# Patient Record
Sex: FEMALE | Race: BLACK OR AFRICAN AMERICAN | NOT HISPANIC OR LATINO | Employment: FULL TIME | URBAN - METROPOLITAN AREA
[De-identification: names, ages, dates, MRNs, and addresses within clinical notes are randomized per-mention and may not be internally consistent; named-entity substitution may affect disease eponyms.]

---

## 2017-03-17 ENCOUNTER — PATIENT OUTREACH (OUTPATIENT)
Dept: ADMINISTRATIVE | Facility: HOSPITAL | Age: 57
End: 2017-03-17

## 2017-03-17 NOTE — LETTER
March 17, 2017    Makenzie Pizarro  [address] [facility] [address]  [facility] [address]           [facility]  [address]  [facility] [address]  Phone: [phone] Dear Mrs. Pizarro:    [facility] is committed to your overall health.  To help you get the most out of each of your visits, we will review your information to make sure you are up to date on all of your recommended tests and/or procedures.      Denis Plunkett MD has found that you may be due for   Health Maintenance Due   Topic    TETANUS VACCINE     Mammogram     Colonoscopy     Influenza Vaccine       If you have had any of the above done at another facility, please bring the records or information with you so that your record at [facility] will be complete.    If you are currently taking medication, please bring it with you to your appointment for review.    We will be happy to assist you with scheduling any necessary appointments or you may contact the [facility] appointment desk at [phone] to schedule at your convenience.     Thank you for choosing [facility] for your healthcare needs,      If you have any questions or concerns, please don't hesitate to call.    Sincerely,    Aurelia WRAY LPN  Care Coordination Department  [facility]

## 2017-03-31 ENCOUNTER — LAB VISIT (OUTPATIENT)
Dept: LAB | Facility: HOSPITAL | Age: 57
End: 2017-03-31
Attending: FAMILY MEDICINE
Payer: COMMERCIAL

## 2017-03-31 ENCOUNTER — OFFICE VISIT (OUTPATIENT)
Dept: FAMILY MEDICINE | Facility: CLINIC | Age: 57
End: 2017-03-31
Payer: COMMERCIAL

## 2017-03-31 VITALS
SYSTOLIC BLOOD PRESSURE: 169 MMHG | HEART RATE: 107 BPM | BODY MASS INDEX: 37.54 KG/M2 | HEIGHT: 69 IN | DIASTOLIC BLOOD PRESSURE: 103 MMHG | TEMPERATURE: 98 F | WEIGHT: 253.44 LBS

## 2017-03-31 DIAGNOSIS — Z12.31 ENCOUNTER FOR SCREENING MAMMOGRAM FOR MALIGNANT NEOPLASM OF BREAST: ICD-10-CM

## 2017-03-31 DIAGNOSIS — G47.00 INSOMNIA, UNSPECIFIED TYPE: ICD-10-CM

## 2017-03-31 DIAGNOSIS — E66.01 SEVERE OBESITY (BMI 35.0-39.9) WITH COMORBIDITY: ICD-10-CM

## 2017-03-31 DIAGNOSIS — F17.200 SMOKING: ICD-10-CM

## 2017-03-31 DIAGNOSIS — M54.50 CHRONIC BILATERAL LOW BACK PAIN WITHOUT SCIATICA: ICD-10-CM

## 2017-03-31 DIAGNOSIS — I10 ESSENTIAL HYPERTENSION: Primary | ICD-10-CM

## 2017-03-31 DIAGNOSIS — I10 ESSENTIAL HYPERTENSION: ICD-10-CM

## 2017-03-31 DIAGNOSIS — G89.29 CHRONIC BILATERAL LOW BACK PAIN WITHOUT SCIATICA: ICD-10-CM

## 2017-03-31 LAB
ALBUMIN SERPL BCP-MCNC: 3.4 G/DL
ALP SERPL-CCNC: 89 U/L
ALT SERPL W/O P-5'-P-CCNC: 19 U/L
ANION GAP SERPL CALC-SCNC: 12 MMOL/L
AST SERPL-CCNC: 17 U/L
BILIRUB SERPL-MCNC: 0.4 MG/DL
BUN SERPL-MCNC: 17 MG/DL
CALCIUM SERPL-MCNC: 9.2 MG/DL
CHLORIDE SERPL-SCNC: 107 MMOL/L
CHOLEST/HDLC SERPL: 3 {RATIO}
CO2 SERPL-SCNC: 24 MMOL/L
CREAT SERPL-MCNC: 0.8 MG/DL
EST. GFR  (AFRICAN AMERICAN): >60 ML/MIN/1.73 M^2
EST. GFR  (NON AFRICAN AMERICAN): >60 ML/MIN/1.73 M^2
GLUCOSE SERPL-MCNC: 118 MG/DL
HDL/CHOLESTEROL RATIO: 33.2 %
HDLC SERPL-MCNC: 205 MG/DL
HDLC SERPL-MCNC: 68 MG/DL
LDLC SERPL CALC-MCNC: 103.2 MG/DL
NONHDLC SERPL-MCNC: 137 MG/DL
POTASSIUM SERPL-SCNC: 3.9 MMOL/L
PROT SERPL-MCNC: 7.3 G/DL
SODIUM SERPL-SCNC: 143 MMOL/L
TRIGL SERPL-MCNC: 169 MG/DL

## 2017-03-31 PROCEDURE — 36415 COLL VENOUS BLD VENIPUNCTURE: CPT | Mod: PO

## 2017-03-31 PROCEDURE — 1160F RVW MEDS BY RX/DR IN RCRD: CPT | Mod: S$GLB,,, | Performed by: FAMILY MEDICINE

## 2017-03-31 PROCEDURE — 99214 OFFICE O/P EST MOD 30 MIN: CPT | Mod: 25,S$GLB,, | Performed by: FAMILY MEDICINE

## 2017-03-31 PROCEDURE — 80061 LIPID PANEL: CPT

## 2017-03-31 PROCEDURE — 90732 PPSV23 VACC 2 YRS+ SUBQ/IM: CPT | Mod: S$GLB,,, | Performed by: FAMILY MEDICINE

## 2017-03-31 PROCEDURE — 3077F SYST BP >= 140 MM HG: CPT | Mod: S$GLB,,, | Performed by: FAMILY MEDICINE

## 2017-03-31 PROCEDURE — 3080F DIAST BP >= 90 MM HG: CPT | Mod: S$GLB,,, | Performed by: FAMILY MEDICINE

## 2017-03-31 PROCEDURE — 90471 IMMUNIZATION ADMIN: CPT | Mod: S$GLB,,, | Performed by: FAMILY MEDICINE

## 2017-03-31 PROCEDURE — 80053 COMPREHEN METABOLIC PANEL: CPT

## 2017-03-31 PROCEDURE — 99999 PR PBB SHADOW E&M-EST. PATIENT-LVL III: CPT | Mod: PBBFAC,,, | Performed by: FAMILY MEDICINE

## 2017-03-31 RX ORDER — HYDROCHLOROTHIAZIDE 25 MG/1
25 TABLET ORAL DAILY
Qty: 30 TABLET | Refills: 5 | Status: SHIPPED | OUTPATIENT
Start: 2017-03-31 | End: 2017-12-15 | Stop reason: SDUPTHER

## 2017-03-31 RX ORDER — AMLODIPINE AND BENAZEPRIL HYDROCHLORIDE 5; 20 MG/1; MG/1
1 CAPSULE ORAL DAILY
COMMUNITY
End: 2017-03-31 | Stop reason: SDUPTHER

## 2017-03-31 RX ORDER — AMLODIPINE AND BENAZEPRIL HYDROCHLORIDE 5; 20 MG/1; MG/1
1 CAPSULE ORAL DAILY
Qty: 30 CAPSULE | Refills: 5 | Status: SHIPPED | OUTPATIENT
Start: 2017-03-31 | End: 2017-06-12

## 2017-03-31 RX ORDER — MELOXICAM 7.5 MG/1
7.5 TABLET ORAL DAILY
Qty: 30 TABLET | Refills: 2 | Status: SHIPPED | OUTPATIENT
Start: 2017-03-31 | End: 2018-03-31

## 2017-03-31 RX ORDER — TRAZODONE HYDROCHLORIDE 50 MG/1
50 TABLET ORAL NIGHTLY PRN
Qty: 30 TABLET | Refills: 5 | Status: SHIPPED | OUTPATIENT
Start: 2017-03-31 | End: 2018-08-28 | Stop reason: SINTOL

## 2017-03-31 RX ORDER — OLMESARTAN MEDOXOMIL 40 MG/1
40 TABLET ORAL DAILY
COMMUNITY
End: 2017-03-31

## 2017-04-01 NOTE — PROGRESS NOTES
Follow-up hypertension.  Patient states ran out of hydrochlorothiazide about a week ago.  States compliance of the medication.  Morbidly obese with difficulty losing.  Still smokes, not itched in quitting currently.  She does complain of some chronic lower back pain without radiation.  No current treatment.  She is due for mammogram.  She also wanted something for sleep.  She works nights chronically.  She does snore but is not aware of apnea.  Not interested sleep evaluation at present.    Past Medical History:  Past Medical History:   Diagnosis Date    Endometriosis     Hypertension     Obesity, Class II, BMI 35-39.9, with comorbidity     Varicose veins      Past Surgical History:   Procedure Laterality Date    BILATERAL SALPINGOOPHORECTOMY      BREAST LUMPECTOMY      benign    HYSTERECTOMY       Social History     Social History    Marital status: Single     Spouse name: N/A    Number of children: N/A    Years of education: N/A     Occupational History    Not on file.     Social History Main Topics    Smoking status: Current Every Day Smoker     Packs/day: 0.50     Years: 35.00    Smokeless tobacco: Not on file    Alcohol use 7.0 oz/week     14 drink(s) per week    Drug use: No    Sexual activity: Not on file     Other Topics Concern    Not on file     Social History Narrative     Family History   Problem Relation Age of Onset    Hypertension Mother     Diabetes Mother     Hypertension Father     Diabetes Father     Lung cancer Paternal Grandfather     Leukemia Paternal Uncle     Lung cancer Maternal Uncle      Review of patient's allergies indicates:  No Known Allergies  Current Outpatient Prescriptions on File Prior to Visit   Medication Sig Dispense Refill    [DISCONTINUED] cyclobenzaprine (FLEXERIL) 10 MG tablet TAKE 1 TABLET (10 MG TOTAL) BY MOUTH 3 (THREE) TIMES DAILY AS NEEDED FOR MUSCLE SPASMS. 30 tablet 0    [DISCONTINUED] hydrochlorothiazide (HYDRODIURIL) 25 MG tablet Take 1  "tablet (25 mg total) by mouth once daily. 30 tablet 11    [] naproxen (NAPROSYN) 500 MG tablet Take 1 tablet (500 mg total) by mouth 2 (two) times daily as needed. 60 tablet 3    [DISCONTINUED] amlodipine-benazepril 5-20 mg (LOTREL) 5-20 mg per capsule Take 1 capsule by mouth once daily. 30 capsule 11     No current facility-administered medications on file prior to visit.            ROS:  GENERAL: No fever, chills,  or significant weight changes.   CARDIOVASCULAR: Denies chest pain, PND, orthopnea or reduced exercise tolerance.  ABDOMEN: Appetite fine. Denies diarrhea, abdominal pain, hematemesis or blood in stool.  URINARY: No flank pain, dysuria or hematuria.      OBJECTIVE:     Vitals:    17 1546   BP: (!) 169/103   Pulse: 107   Temp: 98.2 °F (36.8 °C)   Weight: 114.9 kg (253 lb 6.7 oz)   Height: 5' 9" (1.753 m)     Wt Readings from Last 3 Encounters:   17 114.9 kg (253 lb 6.7 oz)   16 116.9 kg (257 lb 11.5 oz)   03/09/15 115.2 kg (254 lb)     APPEARANCE: Well nourished, well developed, in no acute distress.    HEAD: Normocephalic.  Atraumatic.  No sinus tenderness.  EYES:   Right eye: Pupil reactive.  Conjunctiva clear.    Left eye: Pupil reactive.  Conjunctiva clear.    Both fundi:  Grossly normal to nondilated exam. EOMI.    EARS: TM's intact. Light reflex normal. No retraction or perforation.    NOSE:  clear.  MOUTH & THROAT:  No pharyngeal erythema or exudate. No lesions.  NECK: Supple. No bruits.  No JVD.  No cervical lymphadenopathy.  No thyromegaly.    CHEST: Breath sounds clear bilaterally.  Normal respiratory effort  CARDIOVASCULAR: Normal rate.  Regular rhythm.  No murmurs.  No rub.  No gallops.  ABDOMEN: Bowel sounds normal.  Soft.  No tenderness.  No organomegaly.  PERIPHERAL VASCULAR: No cyanosis.  No clubbing.  No edema.  NEUROLOGIC: No focal findings.  Negative straight leg raise.  Deep tendon reflexes intact  MENTAL STATUS: Alert.  Oriented x 3.  Back decreased range " of motion.  Mild his palpation bilateral lower paraspinous muscles.      Makenzie was seen today for medication refill and back pain.    Diagnoses and all orders for this visit:    Essential hypertension  -     Comprehensive metabolic panel; Future  -     Lipid panel; Future    Severe obesity (BMI 35.0-39.9) with comorbidity    Smoking    Chronic bilateral low back pain without sciatica    Encounter for screening mammogram for malignant neoplasm of breast  -     Mammo Digital Screening Bilat with CAD; Future    Insomnia, unspecified type    Other orders  -     amlodipine-benazepril 5-20 mg (LOTREL) 5-20 mg per capsule; Take 1 capsule by mouth once daily.  -     hydrochlorothiazide (HYDRODIURIL) 25 MG tablet; Take 1 tablet (25 mg total) by mouth once daily.  -     meloxicam (MOBIC) 7.5 MG tablet; Take 1 tablet (7.5 mg total) by mouth once daily.  -     Pneumococcal Polysaccharide Vaccine (23 Valent) (SQ/IM)  -     trazodone (DESYREL) 50 MG tablet; Take 1 tablet (50 mg total) by mouth nightly as needed for Insomnia.     resume medication.  Recheck blood pressure with nurse in 2 weeks.  She is due for colonoscopy, but would like to get blood pressure better controlled.  Declines smoking cessation.  Encourage weight loss.  Recommend physical therapy, but states difficulty with transportation.

## 2017-04-04 ENCOUNTER — HOSPITAL ENCOUNTER (OUTPATIENT)
Dept: RADIOLOGY | Facility: HOSPITAL | Age: 57
Discharge: HOME | End: 2017-04-04
Attending: FAMILY MEDICINE
Payer: COMMERCIAL

## 2017-04-04 DIAGNOSIS — Z12.31 ENCOUNTER FOR SCREENING MAMMOGRAM FOR MALIGNANT NEOPLASM OF BREAST: ICD-10-CM

## 2017-04-04 PROCEDURE — 77067 SCR MAMMO BI INCL CAD: CPT | Mod: TC

## 2017-04-04 PROCEDURE — 77063 BREAST TOMOSYNTHESIS BI: CPT | Mod: 26,,, | Performed by: RADIOLOGY

## 2017-04-04 PROCEDURE — 77067 SCR MAMMO BI INCL CAD: CPT | Mod: 26,,, | Performed by: RADIOLOGY

## 2017-04-17 RX ORDER — AMLODIPINE AND BENAZEPRIL HYDROCHLORIDE 5; 20 MG/1; MG/1
CAPSULE ORAL
Qty: 30 CAPSULE | Refills: 0 | Status: SHIPPED | OUTPATIENT
Start: 2017-04-17 | End: 2017-06-11 | Stop reason: SDUPTHER

## 2017-04-17 RX ORDER — HYDROCHLOROTHIAZIDE 25 MG/1
TABLET ORAL
Qty: 30 TABLET | Refills: 0 | Status: SHIPPED | OUTPATIENT
Start: 2017-04-17 | End: 2018-08-28 | Stop reason: SDUPTHER

## 2017-04-20 DIAGNOSIS — E78.00 ELEVATED CHOLESTEROL: Primary | ICD-10-CM

## 2017-04-20 RX ORDER — PRAVASTATIN SODIUM 20 MG/1
20 TABLET ORAL DAILY
COMMUNITY
End: 2017-04-20 | Stop reason: SDUPTHER

## 2017-04-20 RX ORDER — PRAVASTATIN SODIUM 20 MG/1
20 TABLET ORAL DAILY
Qty: 30 TABLET | Refills: 5 | Status: SHIPPED | OUTPATIENT
Start: 2017-04-20 | End: 2017-12-15 | Stop reason: SDUPTHER

## 2017-04-20 NOTE — TELEPHONE ENCOUNTER
MD GURU Sarkar Staff                     Cholesterol is elevated, sugar borderline, not diabetic level..  Recommend start pravastatin 20 mg daily.  Repeat lipid

## 2017-05-03 ENCOUNTER — TELEPHONE (OUTPATIENT)
Dept: FAMILY MEDICINE | Facility: CLINIC | Age: 57
End: 2017-05-03

## 2017-05-03 NOTE — TELEPHONE ENCOUNTER
----- Message from Magalys Solis sent at 5/3/2017  7:54 AM CDT -----  Please call patient in regards to lab results, [phone] (home)

## 2017-06-12 RX ORDER — AMLODIPINE AND BENAZEPRIL HYDROCHLORIDE 5; 20 MG/1; MG/1
CAPSULE ORAL
Qty: 30 CAPSULE | Refills: 0 | Status: SHIPPED | OUTPATIENT
Start: 2017-06-12 | End: 2017-08-18 | Stop reason: SDUPTHER

## 2017-08-18 RX ORDER — AMLODIPINE AND BENAZEPRIL HYDROCHLORIDE 5; 20 MG/1; MG/1
CAPSULE ORAL
Qty: 30 CAPSULE | Refills: 11 | Status: SHIPPED | OUTPATIENT
Start: 2017-08-18 | End: 2018-08-28 | Stop reason: SDUPTHER

## 2017-12-15 RX ORDER — HYDROCHLOROTHIAZIDE 25 MG/1
25 TABLET ORAL DAILY
Qty: 30 TABLET | Refills: 0 | Status: SHIPPED | OUTPATIENT
Start: 2017-12-15 | End: 2018-08-28 | Stop reason: SDUPTHER

## 2017-12-15 RX ORDER — PRAVASTATIN SODIUM 20 MG/1
20 TABLET ORAL DAILY
Qty: 30 TABLET | Refills: 0 | Status: SHIPPED | OUTPATIENT
Start: 2017-12-15 | End: 2018-08-28 | Stop reason: SDUPTHER

## 2018-08-28 ENCOUNTER — OFFICE VISIT (OUTPATIENT)
Dept: FAMILY MEDICINE | Facility: CLINIC | Age: 58
End: 2018-08-28
Payer: COMMERCIAL

## 2018-08-28 VITALS
WEIGHT: 270 LBS | HEIGHT: 68 IN | SYSTOLIC BLOOD PRESSURE: 163 MMHG | BODY MASS INDEX: 40.92 KG/M2 | HEART RATE: 86 BPM | DIASTOLIC BLOOD PRESSURE: 95 MMHG | TEMPERATURE: 98 F

## 2018-08-28 DIAGNOSIS — Z76.0 MEDICATION REFILL: ICD-10-CM

## 2018-08-28 DIAGNOSIS — E78.5 HYPERLIPIDEMIA, UNSPECIFIED HYPERLIPIDEMIA TYPE: ICD-10-CM

## 2018-08-28 DIAGNOSIS — Z12.31 ENCOUNTER FOR SCREENING MAMMOGRAM FOR MALIGNANT NEOPLASM OF BREAST: ICD-10-CM

## 2018-08-28 DIAGNOSIS — Z00.00 ANNUAL PHYSICAL EXAM: Primary | ICD-10-CM

## 2018-08-28 DIAGNOSIS — E66.01 SEVERE OBESITY (BMI 35.0-39.9) WITH COMORBIDITY: ICD-10-CM

## 2018-08-28 DIAGNOSIS — F17.200 SMOKING: ICD-10-CM

## 2018-08-28 DIAGNOSIS — I10 ESSENTIAL HYPERTENSION: ICD-10-CM

## 2018-08-28 PROCEDURE — 99999 PR PBB SHADOW E&M-EST. PATIENT-LVL III: CPT | Mod: PBBFAC,,, | Performed by: NURSE PRACTITIONER

## 2018-08-28 PROCEDURE — 3080F DIAST BP >= 90 MM HG: CPT | Mod: CPTII,S$GLB,, | Performed by: NURSE PRACTITIONER

## 2018-08-28 PROCEDURE — 3077F SYST BP >= 140 MM HG: CPT | Mod: CPTII,S$GLB,, | Performed by: NURSE PRACTITIONER

## 2018-08-28 PROCEDURE — 99396 PREV VISIT EST AGE 40-64: CPT | Mod: S$GLB,,, | Performed by: NURSE PRACTITIONER

## 2018-08-28 RX ORDER — AMLODIPINE AND BENAZEPRIL HYDROCHLORIDE 5; 20 MG/1; MG/1
1 CAPSULE ORAL DAILY
Qty: 90 CAPSULE | Refills: 3 | Status: SHIPPED | OUTPATIENT
Start: 2018-08-28 | End: 2019-09-30 | Stop reason: SDUPTHER

## 2018-08-28 RX ORDER — HYDROCHLOROTHIAZIDE 25 MG/1
25 TABLET ORAL DAILY
Qty: 90 TABLET | Refills: 3 | Status: SHIPPED | OUTPATIENT
Start: 2018-08-28 | End: 2020-03-30

## 2018-08-28 RX ORDER — NAPROXEN 500 MG/1
500 TABLET ORAL 2 TIMES DAILY
COMMUNITY
End: 2020-07-17

## 2018-08-28 RX ORDER — PRAVASTATIN SODIUM 20 MG/1
20 TABLET ORAL DAILY
Qty: 90 TABLET | Refills: 3 | Status: SHIPPED | OUTPATIENT
Start: 2018-08-28 | End: 2020-07-17

## 2018-08-28 NOTE — PROGRESS NOTES
Subjective:       Patient ID: Makenzie Pizarro is a 57 y.o. female.    Chief Complaint: Annual Exam  Pt in today for annual exam. HTN uncontrolled,however pt has not been taking HCTZ. Pt is also a smoker; declines cessation at present. Healthy diet, exercise discussed. Pt has been out of pravastatin for > 1 m. Labs, colonoscopy, mammogram due. Pt has no other complaints today.   Past Medical History:   Diagnosis Date    Endometriosis     Hypertension     Obesity, Class II, BMI 35-39.9, with comorbidity     Varicose veins      Social History     Socioeconomic History    Marital status: Single     Spouse name: Not on file    Number of children: Not on file    Years of education: Not on file    Highest education level: Not on file   Social Needs    Financial resource strain: Not on file    Food insecurity - worry: Not on file    Food insecurity - inability: Not on file    Transportation needs - medical: Not on file    Transportation needs - non-medical: Not on file   Occupational History    Not on file   Tobacco Use    Smoking status: Current Every Day Smoker     Packs/day: 0.50     Years: 35.00     Pack years: 17.50   Substance and Sexual Activity    Alcohol use: Yes     Alcohol/week: 7.0 oz     Types: 14 drink(s) per week    Drug use: No    Sexual activity: Not on file   Other Topics Concern    Not on file   Social History Narrative    Not on file     Past Surgical History:   Procedure Laterality Date    BILATERAL SALPINGOOPHORECTOMY      BREAST LUMPECTOMY      benign    HYSTERECTOMY         HPI  Review of Systems   Constitutional: Negative.    HENT: Negative.    Eyes: Negative.    Respiratory: Negative.    Cardiovascular: Negative.    Gastrointestinal: Negative.    Endocrine: Negative.    Genitourinary: Negative.    Musculoskeletal: Negative.    Skin: Negative.    Allergic/Immunologic: Negative.    Neurological: Negative.    Psychiatric/Behavioral: Negative.        Objective:      Physical  Exam   Constitutional: She is oriented to person, place, and time. She appears well-developed and well-nourished.   HENT:   Head: Normocephalic.   Right Ear: External ear normal.   Left Ear: External ear normal.   Nose: Nose normal.   Mouth/Throat: Oropharynx is clear and moist.   Eyes: Conjunctivae are normal. Pupils are equal, round, and reactive to light.   Neck: Normal range of motion. Neck supple.   Cardiovascular: Normal rate, regular rhythm and normal heart sounds.   Pulmonary/Chest: Effort normal and breath sounds normal.   Abdominal: Soft. Bowel sounds are normal.   Musculoskeletal: Normal range of motion.   Neurological: She is alert and oriented to person, place, and time.   Skin: Skin is warm and dry. Capillary refill takes 2 to 3 seconds.   Psychiatric: She has a normal mood and affect. Her behavior is normal. Judgment and thought content normal.   Nursing note and vitals reviewed.      Assessment:       1. Annual physical exam    2. Essential hypertension    3. Severe obesity (BMI 35.0-39.9) with comorbidity    4. Smoking    5. Encounter for screening mammogram for malignant neoplasm of breast    6. Hyperlipidemia, unspecified hyperlipidemia type   7. Medication refill        Plan:           Makenzie was seen today for annual exam.    Diagnoses and all orders for this visit:    Annual physical exam  Essential hypertension  Severe obesity (BMI 35.0-39.9) with comorbidity  Smoking  Encounter for screening mammogram for malignant neoplasm of breast  Hyperlipidemia, unspecified hyperlipidemia type  -     Mammo Digital Screening Bilateral With CAD; Future  -     Lipid panel; Future  -     Comprehensive metabolic panel; Future  -     TSH; Future  -     CBC auto differential; Future  -     Case request GI: COLONOSCOPY        Smoking cessation recommended        Healthy diet, weight loss recommended        Medications as prescribed        Monitor BP daily x 2 w; keep log; return log to clinic for review      Medication refill  -     hydroCHLOROthiazide (HYDRODIURIL) 25 MG tablet; Take 1 tablet (25 mg total) by mouth once daily.  -     pravastatin (PRAVACHOL) 20 MG tablet; Take 1 tablet (20 mg total) by mouth once daily.  -     amlodipine-benazepril 5-20 mg (LOTREL) 5-20 mg per capsule; Take 1 capsule by mouth once daily.

## 2018-08-31 ENCOUNTER — DOCUMENTATION ONLY (OUTPATIENT)
Dept: ENDOSCOPY | Facility: HOSPITAL | Age: 58
End: 2018-08-31

## 2018-11-05 RX ORDER — HYDROCHLOROTHIAZIDE 25 MG/1
25 TABLET ORAL DAILY
Qty: 30 TABLET | Refills: 0 | Status: SHIPPED | OUTPATIENT
Start: 2018-11-05 | End: 2019-03-15 | Stop reason: SDUPTHER

## 2018-11-05 NOTE — TELEPHONE ENCOUNTER
Refill ×1.  Need blood pressure check.  She also needs to get the lab work that was ordered by Toshia at last visit

## 2019-02-14 ENCOUNTER — PATIENT OUTREACH (OUTPATIENT)
Dept: ADMINISTRATIVE | Facility: HOSPITAL | Age: 59
End: 2019-02-14

## 2019-03-15 ENCOUNTER — OFFICE VISIT (OUTPATIENT)
Dept: FAMILY MEDICINE | Facility: CLINIC | Age: 59
End: 2019-03-15
Payer: COMMERCIAL

## 2019-03-15 ENCOUNTER — HOSPITAL ENCOUNTER (OUTPATIENT)
Dept: RADIOLOGY | Facility: HOSPITAL | Age: 59
Discharge: HOME | End: 2019-03-15
Attending: NURSE PRACTITIONER
Payer: COMMERCIAL

## 2019-03-15 ENCOUNTER — TELEPHONE (OUTPATIENT)
Dept: ORTHOPEDICS | Facility: CLINIC | Age: 59
End: 2019-03-15

## 2019-03-15 VITALS
SYSTOLIC BLOOD PRESSURE: 151 MMHG | DIASTOLIC BLOOD PRESSURE: 92 MMHG | HEART RATE: 83 BPM | HEIGHT: 69 IN | WEIGHT: 273 LBS | BODY MASS INDEX: 40.43 KG/M2 | TEMPERATURE: 99 F

## 2019-03-15 DIAGNOSIS — G89.29 CHRONIC MIDLINE LOW BACK PAIN WITH LEFT-SIDED SCIATICA: ICD-10-CM

## 2019-03-15 DIAGNOSIS — G89.29 CHRONIC MIDLINE LOW BACK PAIN WITH LEFT-SIDED SCIATICA: Primary | ICD-10-CM

## 2019-03-15 DIAGNOSIS — M54.42 CHRONIC MIDLINE LOW BACK PAIN WITH LEFT-SIDED SCIATICA: ICD-10-CM

## 2019-03-15 DIAGNOSIS — M54.42 CHRONIC MIDLINE LOW BACK PAIN WITH LEFT-SIDED SCIATICA: Primary | ICD-10-CM

## 2019-03-15 DIAGNOSIS — I10 ESSENTIAL HYPERTENSION: ICD-10-CM

## 2019-03-15 PROCEDURE — 3077F PR MOST RECENT SYSTOLIC BLOOD PRESSURE >= 140 MM HG: ICD-10-PCS | Mod: CPTII,S$GLB,, | Performed by: NURSE PRACTITIONER

## 2019-03-15 PROCEDURE — 3077F SYST BP >= 140 MM HG: CPT | Mod: CPTII,S$GLB,, | Performed by: NURSE PRACTITIONER

## 2019-03-15 PROCEDURE — 99999 PR PBB SHADOW E&M-EST. PATIENT-LVL V: CPT | Mod: PBBFAC,,, | Performed by: NURSE PRACTITIONER

## 2019-03-15 PROCEDURE — 99213 OFFICE O/P EST LOW 20 MIN: CPT | Mod: 25,S$GLB,, | Performed by: NURSE PRACTITIONER

## 2019-03-15 PROCEDURE — 72110 X-RAY EXAM L-2 SPINE 4/>VWS: CPT | Mod: TC,PO

## 2019-03-15 PROCEDURE — 3080F PR MOST RECENT DIASTOLIC BLOOD PRESSURE >= 90 MM HG: ICD-10-PCS | Mod: CPTII,S$GLB,, | Performed by: NURSE PRACTITIONER

## 2019-03-15 PROCEDURE — 99999 PR PBB SHADOW E&M-EST. PATIENT-LVL V: ICD-10-PCS | Mod: PBBFAC,,, | Performed by: NURSE PRACTITIONER

## 2019-03-15 PROCEDURE — 3080F DIAST BP >= 90 MM HG: CPT | Mod: CPTII,S$GLB,, | Performed by: NURSE PRACTITIONER

## 2019-03-15 PROCEDURE — 72110 X-RAY EXAM L-2 SPINE 4/>VWS: CPT | Mod: 26,,, | Performed by: RADIOLOGY

## 2019-03-15 PROCEDURE — 3008F BODY MASS INDEX DOCD: CPT | Mod: CPTII,S$GLB,, | Performed by: NURSE PRACTITIONER

## 2019-03-15 PROCEDURE — 3008F PR BODY MASS INDEX (BMI) DOCUMENTED: ICD-10-PCS | Mod: CPTII,S$GLB,, | Performed by: NURSE PRACTITIONER

## 2019-03-15 PROCEDURE — 96372 THER/PROPH/DIAG INJ SC/IM: CPT | Mod: 59,S$GLB,, | Performed by: NURSE PRACTITIONER

## 2019-03-15 PROCEDURE — 99213 PR OFFICE/OUTPT VISIT, EST, LEVL III, 20-29 MIN: ICD-10-PCS | Mod: 25,S$GLB,, | Performed by: NURSE PRACTITIONER

## 2019-03-15 PROCEDURE — 96372 PR INJECTION,THERAP/PROPH/DIAG2ST, IM OR SUBCUT: ICD-10-PCS | Mod: 59,S$GLB,, | Performed by: NURSE PRACTITIONER

## 2019-03-15 PROCEDURE — 72110 XR LUMBAR SPINE COMPLETE 5 VIEW: ICD-10-PCS | Mod: 26,,, | Performed by: RADIOLOGY

## 2019-03-15 RX ORDER — HYDROCHLOROTHIAZIDE 25 MG/1
25 TABLET ORAL DAILY
Qty: 30 TABLET | Refills: 0 | Status: SHIPPED | OUTPATIENT
Start: 2019-03-15 | End: 2019-05-06 | Stop reason: SDUPTHER

## 2019-03-15 RX ORDER — TIZANIDINE HYDROCHLORIDE 2 MG/1
CAPSULE, GELATIN COATED ORAL
Refills: 0 | COMMUNITY
Start: 2019-02-21 | End: 2020-07-17

## 2019-03-15 RX ORDER — TRAMADOL HYDROCHLORIDE 50 MG/1
TABLET ORAL
Refills: 0 | COMMUNITY
Start: 2019-02-21

## 2019-03-15 RX ORDER — KETOROLAC TROMETHAMINE 30 MG/ML
30 INJECTION, SOLUTION INTRAMUSCULAR; INTRAVENOUS
Status: COMPLETED | OUTPATIENT
Start: 2019-03-15 | End: 2019-03-15

## 2019-03-15 RX ADMIN — KETOROLAC TROMETHAMINE 30 MG: 30 INJECTION, SOLUTION INTRAMUSCULAR; INTRAVENOUS at 10:03

## 2019-03-15 NOTE — PATIENT INSTRUCTIONS
Report to ER immediately if symptoms worsen  Monitor BP daily; keep log x 1 w; return log to clinic for review

## 2019-03-15 NOTE — PROGRESS NOTES
Subjective:       Patient ID: Makenzie Pizarro is a 58 y.o. female.    Chief Complaint: Back Pain (lower back)    Back Pain   This is a chronic problem. The current episode started more than 1 year ago. The problem occurs daily. The problem has been gradually worsening since onset. The pain is present in the lumbar spine. The quality of the pain is described as aching. The pain radiates to the left thigh. The pain is moderate. Associated symptoms include numbness and tingling. Pertinent negatives include no abdominal pain, bladder incontinence, bowel incontinence, chest pain, dysuria, fever, headaches, leg pain, paresis, paresthesias, pelvic pain, perianal numbness, weakness or weight loss. Risk factors include obesity. She has tried muscle relaxant (ultram; last lumbar xray 2016) for the symptoms. The treatment provided no relief.   BP elevated today; pt states has not been taking HCTZ.  Past Medical History:   Diagnosis Date    Endometriosis     Hypertension     Obesity, Class II, BMI 35-39.9, with comorbidity     Varicose veins      Social History     Socioeconomic History    Marital status: Single     Spouse name: Not on file    Number of children: Not on file    Years of education: Not on file    Highest education level: Not on file   Social Needs    Financial resource strain: Not on file    Food insecurity - worry: Not on file    Food insecurity - inability: Not on file    Transportation needs - medical: Not on file    Transportation needs - non-medical: Not on file   Occupational History    Not on file   Tobacco Use    Smoking status: Current Every Day Smoker     Packs/day: 0.50     Years: 35.00     Pack years: 17.50   Substance and Sexual Activity    Alcohol use: Yes     Alcohol/week: 7.0 oz     Types: 14 drink(s) per week    Drug use: No    Sexual activity: Not on file   Other Topics Concern    Not on file   Social History Narrative    Not on file     Past Surgical History:   Procedure  Laterality Date    BILATERAL SALPINGOOPHORECTOMY      BREAST LUMPECTOMY      benign    HYSTERECTOMY         Review of Systems   Constitutional: Negative.  Negative for fever and weight loss.   HENT: Negative.    Eyes: Negative.    Respiratory: Negative.    Cardiovascular: Negative.  Negative for chest pain.   Gastrointestinal: Negative.  Negative for abdominal pain and bowel incontinence.   Endocrine: Negative.    Genitourinary: Negative.  Negative for bladder incontinence, dysuria and pelvic pain.   Musculoskeletal: Positive for back pain.   Skin: Negative.    Allergic/Immunologic: Negative.    Neurological: Positive for tingling and numbness. Negative for weakness, headaches and paresthesias.   Psychiatric/Behavioral: Negative.        Objective:      Physical Exam   Constitutional: She is oriented to person, place, and time. She appears well-developed and well-nourished.   HENT:   Head: Normocephalic.   Right Ear: External ear normal.   Left Ear: External ear normal.   Nose: Nose normal.   Mouth/Throat: Oropharynx is clear and moist.   Eyes: Conjunctivae are normal. Pupils are equal, round, and reactive to light.   Neck: Normal range of motion. Neck supple.   Cardiovascular: Normal rate, regular rhythm and normal heart sounds.   Pulmonary/Chest: Effort normal and breath sounds normal.   Abdominal: Soft. Bowel sounds are normal.   Musculoskeletal: Normal range of motion.        Lumbar back: She exhibits pain. She exhibits normal range of motion, no tenderness, no bony tenderness, no swelling, no edema, no deformity, no laceration, no spasm and normal pulse.   Neurological: She is alert and oriented to person, place, and time.   Skin: Skin is warm and dry. Capillary refill takes 2 to 3 seconds.   Psychiatric: She has a normal mood and affect. Her behavior is normal. Judgment and thought content normal.   Nursing note and vitals reviewed.      Assessment:       1. Chronic midline low back pain with left-sided  sciatica    2. Essential hypertension        Plan:           Makenzie was seen today for back pain.    Diagnoses and all orders for this visit:    Chronic midline low back pain with left-sided sciatica  -     X-Ray Lumbar Spine Complete 5 View; Future  -     MRI Lumbar Spine Without Contrast; Future  -     Ambulatory referral to Neurosurgery  -     ketorolac injection 30 mg    Essential hypertension  -     hydroCHLOROthiazide (HYDRODIURIL) 25 MG tablet; Take 1 tablet (25 mg total) by mouth once daily.  Monitor BP daily; keep log x 1 w; return log to clinic for review

## 2019-03-18 ENCOUNTER — TELEPHONE (OUTPATIENT)
Dept: ORTHOPEDICS | Facility: CLINIC | Age: 59
End: 2019-03-18

## 2019-03-18 ENCOUNTER — TELEPHONE (OUTPATIENT)
Dept: FAMILY MEDICINE | Facility: CLINIC | Age: 59
End: 2019-03-18

## 2019-03-18 NOTE — TELEPHONE ENCOUNTER
----- Message from Gabbie Quijano sent at 3/18/2019  9:29 AM CDT -----  .Type:  Test Results    Who Called: pt  Name of Test (Lab/Mammo/Etc): xray  Date of Test: 3/15  Ordering Provider: angel luis  Where the test was performed: [facility]  Would the patient rather a call back or a response via [facility]? call  Best Call Back Number: .[phone]  Additional Information:

## 2019-04-08 ENCOUNTER — HOSPITAL ENCOUNTER (OUTPATIENT)
Dept: RADIOLOGY | Facility: HOSPITAL | Age: 59
Discharge: HOME | End: 2019-04-08
Attending: NURSE PRACTITIONER
Payer: COMMERCIAL

## 2019-04-08 DIAGNOSIS — M54.42 CHRONIC MIDLINE LOW BACK PAIN WITH LEFT-SIDED SCIATICA: ICD-10-CM

## 2019-04-08 DIAGNOSIS — G89.29 CHRONIC MIDLINE LOW BACK PAIN WITH LEFT-SIDED SCIATICA: ICD-10-CM

## 2019-04-08 PROCEDURE — 72148 MRI LUMBAR SPINE WITHOUT CONTRAST: ICD-10-PCS | Mod: 26,,, | Performed by: RADIOLOGY

## 2019-04-08 PROCEDURE — 72148 MRI LUMBAR SPINE W/O DYE: CPT | Mod: TC,PO

## 2019-04-08 PROCEDURE — 72148 MRI LUMBAR SPINE W/O DYE: CPT | Mod: 26,,, | Performed by: RADIOLOGY

## 2019-04-09 ENCOUNTER — TELEPHONE (OUTPATIENT)
Dept: FAMILY MEDICINE | Facility: CLINIC | Age: 59
End: 2019-04-09

## 2019-04-09 ENCOUNTER — TELEPHONE (OUTPATIENT)
Dept: NEUROSURGERY | Facility: CLINIC | Age: 59
End: 2019-04-09

## 2019-04-09 NOTE — TELEPHONE ENCOUNTER
Called patient to inform her of scheduled appointment with Dr. Liriano. No answer. LEft voicemail.

## 2019-04-09 NOTE — TELEPHONE ENCOUNTER
----- Message from Brenna Javier LPN sent at 4/9/2019 10:24 AM CDT -----  Patient informed, she is asking for appt in [place], instead of [place], staff message sent to neurosurgery clinic support, patient aware someone will call her to reschedule.

## 2019-04-09 NOTE — TELEPHONE ENCOUNTER
Patient given MRI results, requesting a neurosurgery appt in [place], advised we will send a message to have them contact her, if she gets appt, she will call and cancel the appt in [place] on Friday

## 2019-04-09 NOTE — TELEPHONE ENCOUNTER
----- Message from Sarah Titus sent at 4/9/2019 10:07 AM CDT -----  Contact: self [phone]  Type:  Test Results    Who Called: Makenzie Pizarro  Name of Test (Lab/Mammo/Etc): MRI  Date of Test: 04/08/19  Ordering Provider: Eri  Where the test was performed: [facility]  Would the patient rather a call back or a response via [facility]? Call back  Best Call Back Number: [phone]  Additional Information:

## 2019-05-06 ENCOUNTER — INITIAL CONSULT (OUTPATIENT)
Dept: NEUROSURGERY | Facility: CLINIC | Age: 59
End: 2019-05-06
Payer: COMMERCIAL

## 2019-05-06 VITALS
HEIGHT: 69 IN | DIASTOLIC BLOOD PRESSURE: 100 MMHG | SYSTOLIC BLOOD PRESSURE: 179 MMHG | WEIGHT: 273.38 LBS | BODY MASS INDEX: 40.49 KG/M2 | HEART RATE: 81 BPM

## 2019-05-06 DIAGNOSIS — E66.01 SEVERE OBESITY (BMI 35.0-39.9) WITH COMORBIDITY: ICD-10-CM

## 2019-05-06 DIAGNOSIS — M54.42 CHRONIC BILATERAL LOW BACK PAIN WITH LEFT-SIDED SCIATICA: ICD-10-CM

## 2019-05-06 DIAGNOSIS — F17.200 SMOKING: ICD-10-CM

## 2019-05-06 DIAGNOSIS — M54.50 CHRONIC BILATERAL LOW BACK PAIN WITHOUT SCIATICA: Primary | ICD-10-CM

## 2019-05-06 DIAGNOSIS — G89.29 CHRONIC BILATERAL LOW BACK PAIN WITH LEFT-SIDED SCIATICA: ICD-10-CM

## 2019-05-06 DIAGNOSIS — M54.2 CERVICALGIA: ICD-10-CM

## 2019-05-06 DIAGNOSIS — G89.29 CHRONIC BILATERAL LOW BACK PAIN WITHOUT SCIATICA: Primary | ICD-10-CM

## 2019-05-06 PROCEDURE — 99244 PR OFFICE CONSULTATION,LEVEL IV: ICD-10-PCS | Mod: S$GLB,,, | Performed by: NEUROLOGICAL SURGERY

## 2019-05-06 PROCEDURE — 99999 PR PBB SHADOW E&M-EST. PATIENT-LVL III: CPT | Mod: PBBFAC,,, | Performed by: NEUROLOGICAL SURGERY

## 2019-05-06 PROCEDURE — 99999 PR PBB SHADOW E&M-EST. PATIENT-LVL III: ICD-10-PCS | Mod: PBBFAC,,, | Performed by: NEUROLOGICAL SURGERY

## 2019-05-06 PROCEDURE — 99244 OFF/OP CNSLTJ NEW/EST MOD 40: CPT | Mod: S$GLB,,, | Performed by: NEUROLOGICAL SURGERY

## 2019-05-06 NOTE — PROGRESS NOTES
Neurosurgery History and Physical    Patient ID: Makenzie Pizarro is a 58 y.o. female.    Chief Complaint   Patient presents with    Lumbar Spine Pain (L-Spine)     chronic midline lowback pai8n with left side sciatica to knee, pain is burning & throbbing type pain, worse when standing, nothing seems to make it better, NO GREGORY or PT. Oswestry = 24, PHQ = 18         Review of Systems   Constitutional: Positive for activity change.   HENT: Negative.    Eyes: Negative.    Respiratory: Negative.    Cardiovascular: Negative.    Gastrointestinal: Negative.    Endocrine: Negative.    Genitourinary: Negative.    Musculoskeletal: Positive for back pain and neck pain. Negative for gait problem.   Skin: Negative.    Allergic/Immunologic: Negative.    Neurological: Positive for weakness and numbness.   Hematological: Negative.    Psychiatric/Behavioral: Negative.        Past Medical History:   Diagnosis Date    Endometriosis     Hypertension     Obesity, Class II, BMI 35-39.9, with comorbidity     Varicose veins      Social History     Socioeconomic History    Marital status: Single     Spouse name: Not on file    Number of children: Not on file    Years of education: Not on file    Highest education level: Not on file   Occupational History    Not on file   Social Needs    Financial resource strain: Not on file    Food insecurity:     Worry: Not on file     Inability: Not on file    Transportation needs:     Medical: Not on file     Non-medical: Not on file   Tobacco Use    Smoking status: Current Every Day Smoker     Packs/day: 0.50     Years: 35.00     Pack years: 17.50   Substance and Sexual Activity    Alcohol use: Yes     Alcohol/week: 7.0 oz     Types: 14 drink(s) per week    Drug use: No    Sexual activity: Not on file   Lifestyle    Physical activity:     Days per week: Not on file     Minutes per session: Not on file    Stress: Not on file   Relationships    Social connections:     Talks on phone:  "Not on file     Gets together: Not on file     Attends Orthodox service: Not on file     Active member of club or organization: Not on file     Attends meetings of clubs or organizations: Not on file     Relationship status: Not on file   Other Topics Concern    Not on file   Social History Narrative    Not on file     Family History   Problem Relation Age of Onset    Hypertension Mother     Diabetes Mother     Hypertension Father     Diabetes Father     Lung cancer Paternal Grandfather     Leukemia Paternal Uncle     Lung cancer Maternal Uncle      Review of patient's allergies indicates:  No Known Allergies    Current Outpatient Medications:     amlodipine-benazepril 5-20 mg (LOTREL) 5-20 mg per capsule, Take 1 capsule by mouth once daily., Disp: 90 capsule, Rfl: 3    hydroCHLOROthiazide (HYDRODIURIL) 25 MG tablet, Take 1 tablet (25 mg total) by mouth once daily., Disp: 90 tablet, Rfl: 3    naproxen (NAPROSYN) 500 MG tablet, Take 500 mg by mouth 2 (two) times daily., Disp: , Rfl:     pravastatin (PRAVACHOL) 20 MG tablet, Take 1 tablet (20 mg total) by mouth once daily., Disp: 90 tablet, Rfl: 3    tiZANidine 2 mg Cap, TAKE 1 CAPSULE BY MOUTH THREE TIMES A DAY AS NEEDED FOR MUSCLE SPASMS, Disp: , Rfl: 0    traMADol (ULTRAM) 50 mg tablet, TAKE 1 TABLET BY MOUTH EVERY 6 HOURS AS NEEDED FOR PAIN FOR UP TO 5 DAYS, Disp: , Rfl: 0  Blood pressure (!) 179/100, pulse 81, height 5' 9" (1.753 m), weight 124 kg (273 lb 5.9 oz).      Neurologic Exam     Mental Status   Oriented to person, place, and time.   Attention: normal. Concentration: normal.   Speech: speech is normal   Level of consciousness: alert  Knowledge: good.     Cranial Nerves     CN II   Visual acuity: normal    CN III, IV, VI   Pupils are equal, round, and reactive to light.  Extraocular motions are normal.     CN V   Facial sensation intact.     CN VII   Facial expression full, symmetric.     CN VIII   Hearing: intact    CN IX, X   Palate: " symmetric    CN XI   CN XI normal.     CN XII   CN XII normal.     Motor Exam   Muscle bulk: normal  Overall muscle tone: normal  Right arm pronator drift: absent  Left arm pronator drift: absent    Strength   Right deltoid: 5/5  Left deltoid: 5/5  Right biceps: 5/5  Left biceps: 5/5  Right triceps: 5/5  Left triceps: 5/5  Right wrist flexion: 5/5  Left wrist flexion: 5/5  Right wrist extension: 5/5  Left wrist extension: 5/5  Right interossei: 5/5  Left interossei: 5/5  Right iliopsoas: 4/5  Left iliopsoas: 4/5  Right quadriceps: 5/5  Left quadriceps: 5/5  Right hamstrin/5  Left hamstrin/5  Right anterior tibial: 5/5  Left anterior tibial: 5/5  Right posterior tibial: 5/5  Left posterior tibial: 5/5  Right peroneal: 5/5  Left peroneal: 5/5  Right gastroc: 5/5  Left gastroc: 5/5    Sensory Exam   Light touch normal.     Gait, Coordination, and Reflexes     Gait  Gait: (antalgic)    Coordination   Romberg: negative  Finger to nose coordination: normal    Tremor   Resting tremor: absent    Reflexes   Right brachioradialis: 1+  Left brachioradialis: 2+  Right biceps: 1+  Left biceps: 2+  Right triceps: 1+  Left triceps: 2+  Right patellar: 2+  Left patellar: 2+  Right achilles: 0  Left achilles: 0  Right plantar: normal  Left plantar: normal  Right Calhoun: present  Left Calhoun: present  Right ankle clonus: absent  Left ankle clonus: absent  Right pendular knee jerk: present  Left pendular knee jerk: present      Physical Exam   Constitutional: She is oriented to person, place, and time. She appears well-developed and well-nourished.   HENT:   Head: Normocephalic and atraumatic.   Eyes: Pupils are equal, round, and reactive to light. EOM are normal.   Neck: Normal range of motion. Neck supple.   Cardiovascular: Normal rate and regular rhythm.   Pulmonary/Chest: Effort normal.   Abdominal: Soft.   Musculoskeletal: Normal range of motion.   Neurological: She is alert and oriented to person, place, and time. She  "has a normal Finger-Nose-Finger Test and a normal Romberg Test.   Reflex Scores:       Tricep reflexes are 1+ on the right side and 2+ on the left side.       Bicep reflexes are 1+ on the right side and 2+ on the left side.       Brachioradialis reflexes are 1+ on the right side and 2+ on the left side.       Patellar reflexes are 2+ on the right side and 2+ on the left side.       Achilles reflexes are 0 on the right side and 0 on the left side.  Skin: Skin is warm and dry.   Psychiatric: She has a normal mood and affect. Her speech is normal and behavior is normal. Judgment and thought content normal.   Nursing note and vitals reviewed.      Vital Signs  Pulse: 81  BP: (!) 179/100  Pain Score: 10-Worst pain ever  Height and Weight  Height: 5' 9" (175.3 cm)  Weight: 124 kg (273 lb 5.9 oz)  BSA (Calculated - sq m): 2.46 sq meters  BMI (Calculated): 40.5  Weight in (lb) to have BMI = 25: 168.9]    Provider dictation:  I reviewed the imaging. On lumbar spine MRI, there is no central stenosis. At L3-L4, there is moderate left foraminal stenosis from a far lateral disc herniation.     Several years of lower back pain, worsened over the last year and in the last few weeks, has been radiating to her left thigh and knee with burning in her lateral left thigh. Pain worse with with spine flexion and with standing.  Back pain worse than leg pain. She has tried pain medication with no relief but has not had PT or seen Pain management. Also C/O numbness in both hands and chronic neck pain radiating to both shoulders. Describes her hands sometimes "wanting to throw thing down" involuntarily. Denies balance issues.     On exam, obese, uncomfortable, no rosa isela weakness but has give-away weakness in all muscle groups and has difficulty with bilateral hip flexion, although this could be related to her body habitus. She had incidental findings of bilateral Calhoun's signs and Babinski and her upper extremity DTR are more prominent on " the left.     I do not recommend lumbar surgery at this point. Will order PT and refer to pain management for injections. Regarding her incidental hyperreflexia and symptoms suggestive of cervical spinal cord dysfunction, will order MRI C spine without contrast to rule out compressive pathology. We will call her with the results.     I have counseled her on weight loss, exercise and smoking cessation, all of which are likely to help her low back pain.     Visit Diagnosis:  Chronic bilateral low back pain without sciatica  -     MRI Cervical Spine Without Contrast; Future; Expected date: 05/06/2019  -     Ambulatory Referral to Physical/Occupational Therapy  -     Ambulatory Referral to Pain Clinic    Cervicalgia  -     MRI Cervical Spine Without Contrast; Future; Expected date: 05/06/2019  -     Ambulatory Referral to Physical/Occupational Therapy  -     Ambulatory Referral to Pain Clinic    Severe obesity (BMI 35.0-39.9) with comorbidity    Smoking    Chronic bilateral low back pain with left-sided sciatica

## 2019-05-06 NOTE — LETTER
May 6, 2019      [place]  [address] [address]  [place] [address]           Central - Neurosurgery  [address] [facility] [address] [place] [address]  Phone: [phone]  Fax: [phone]          Patient: Makenzie Pizarro   MR Number: [id number]   YOB: 1960   Date of Visit: 5/6/2019       Dear Toshia Payne:    Thank you for referring Makenzie Pizarro to me for evaluation. Attached you will find relevant portions of my assessment and plan of care.    If you have questions, please do not hesitate to call me. I look forward to following Makenzie Pizarro along with you.    Sincerely,    Sonia Payne LPN    Enclosure  CC:  No Recipients    If you would like to receive this communication electronically, please contact [email or web address] or [phone] to request more information on [facility] access.    For providers and/or their staff who would like to refer a patient to [facility], please contact us through our one-stop-shop provider referral line, [facility], at [phone].    If you feel you have received this communication in error or would no longer like to receive these types of communications, please e-mail [email or web address]

## 2019-05-29 ENCOUNTER — TELEPHONE (OUTPATIENT)
Dept: NEUROSURGERY | Facility: CLINIC | Age: 59
End: 2019-05-29

## 2019-05-29 NOTE — TELEPHONE ENCOUNTER
----- Message from Breanne Meng PA-C sent at 5/29/2019 10:53 AM CDT -----  Hi,    Can you please reschedule this patient's MRI cervical spine?    Thanks,    Breanne

## 2019-10-01 RX ORDER — AMLODIPINE AND BENAZEPRIL HYDROCHLORIDE 5; 20 MG/1; MG/1
CAPSULE ORAL
Qty: 30 CAPSULE | Refills: 0 | Status: SHIPPED | OUTPATIENT
Start: 2019-10-01 | End: 2020-05-25

## 2019-11-14 ENCOUNTER — PATIENT OUTREACH (OUTPATIENT)
Dept: ADMINISTRATIVE | Facility: HOSPITAL | Age: 59
End: 2019-11-14

## 2020-01-24 ENCOUNTER — PATIENT OUTREACH (OUTPATIENT)
Dept: ADMINISTRATIVE | Facility: HOSPITAL | Age: 60
End: 2020-01-24

## 2020-02-03 ENCOUNTER — OFFICE VISIT (OUTPATIENT)
Dept: FAMILY MEDICINE | Facility: CLINIC | Age: 60
End: 2020-02-03
Payer: COMMERCIAL

## 2020-02-03 VITALS
SYSTOLIC BLOOD PRESSURE: 139 MMHG | HEART RATE: 72 BPM | WEIGHT: 266.81 LBS | TEMPERATURE: 98 F | HEIGHT: 69 IN | BODY MASS INDEX: 39.52 KG/M2 | DIASTOLIC BLOOD PRESSURE: 85 MMHG

## 2020-02-03 DIAGNOSIS — L50.9 URTICARIA: Primary | ICD-10-CM

## 2020-02-03 PROCEDURE — 99213 PR OFFICE/OUTPT VISIT, EST, LEVL III, 20-29 MIN: ICD-10-PCS | Mod: 25,S$GLB,, | Performed by: FAMILY MEDICINE

## 2020-02-03 PROCEDURE — 3008F BODY MASS INDEX DOCD: CPT | Mod: CPTII,S$GLB,, | Performed by: FAMILY MEDICINE

## 2020-02-03 PROCEDURE — 3079F DIAST BP 80-89 MM HG: CPT | Mod: CPTII,S$GLB,, | Performed by: FAMILY MEDICINE

## 2020-02-03 PROCEDURE — 96372 THER/PROPH/DIAG INJ SC/IM: CPT | Mod: S$GLB,,, | Performed by: FAMILY MEDICINE

## 2020-02-03 PROCEDURE — 3075F PR MOST RECENT SYSTOLIC BLOOD PRESS GE 130-139MM HG: ICD-10-PCS | Mod: CPTII,S$GLB,, | Performed by: FAMILY MEDICINE

## 2020-02-03 PROCEDURE — 96372 PR INJECTION,THERAP/PROPH/DIAG2ST, IM OR SUBCUT: ICD-10-PCS | Mod: S$GLB,,, | Performed by: FAMILY MEDICINE

## 2020-02-03 PROCEDURE — 3075F SYST BP GE 130 - 139MM HG: CPT | Mod: CPTII,S$GLB,, | Performed by: FAMILY MEDICINE

## 2020-02-03 PROCEDURE — 99999 PR PBB SHADOW E&M-EST. PATIENT-LVL III: CPT | Mod: PBBFAC,,, | Performed by: FAMILY MEDICINE

## 2020-02-03 PROCEDURE — 99213 OFFICE O/P EST LOW 20 MIN: CPT | Mod: 25,S$GLB,, | Performed by: FAMILY MEDICINE

## 2020-02-03 PROCEDURE — 3008F PR BODY MASS INDEX (BMI) DOCUMENTED: ICD-10-PCS | Mod: CPTII,S$GLB,, | Performed by: FAMILY MEDICINE

## 2020-02-03 PROCEDURE — 99999 PR PBB SHADOW E&M-EST. PATIENT-LVL III: ICD-10-PCS | Mod: PBBFAC,,, | Performed by: FAMILY MEDICINE

## 2020-02-03 PROCEDURE — 3079F PR MOST RECENT DIASTOLIC BLOOD PRESSURE 80-89 MM HG: ICD-10-PCS | Mod: CPTII,S$GLB,, | Performed by: FAMILY MEDICINE

## 2020-02-03 RX ORDER — DEXAMETHASONE SODIUM PHOSPHATE 4 MG/ML
8 INJECTION, SOLUTION INTRA-ARTICULAR; INTRALESIONAL; INTRAMUSCULAR; INTRAVENOUS; SOFT TISSUE ONCE
Status: COMPLETED | OUTPATIENT
Start: 2020-02-03 | End: 2020-02-03

## 2020-02-03 RX ORDER — DICLOFENAC SODIUM 75 MG/1
75 TABLET, DELAYED RELEASE ORAL 2 TIMES DAILY
Qty: 60 TABLET | Refills: 2 | Status: SHIPPED | OUTPATIENT
Start: 2020-02-03 | End: 2020-04-24

## 2020-02-03 RX ADMIN — DEXAMETHASONE SODIUM PHOSPHATE 8 MG: 4 INJECTION, SOLUTION INTRA-ARTICULAR; INTRALESIONAL; INTRAMUSCULAR; INTRAVENOUS; SOFT TISSUE at 10:02

## 2020-02-03 NOTE — PROGRESS NOTES
The patient presents with itchy rash for the past 2 days    but no fever.Had episode 2 m ago but neither time had oral or bronchial symptoms. Patient had been on naproxen for several weeks so not highly suspicious but will label as allergy.     ROS:  General: No fever or sig wt change  HEENT:No other PND eye pain or dc  Respiratory: No cough wheezing  PE: vital signs noted  HEENT: Normocephalic,with no recent trauma,PERRLA,EOMI,conjunctiva injected with no exudate.Nasopharynx is clear TM clear   Neck:Supple without adenopathy  Chest:Clear bilateral breath sounds    Heart:Regular rhthym without murmer  Abdomen:Soft, non tender,no masses, no hepatosplenomegaly     Impression:Allergic reaction? Naproxen   Plan:  Dexa 8 im  After reaction resolves can cautiously try diclofenac-pt had been on naprosen for weeks wo reaction so it may not be the cause of todays symptoms

## 2020-02-03 NOTE — LETTER
February 3, 2020      [facility]  [address] [address]  Phone: [phone]  Fax: [phone]       Patient: Makenzie Pizarro   YOB: 1960  Date of Visit: 02/03/2020    To Whom It May Concern:    NOHEMI Pizarro  was at [facility] on 02/03/2020. She may return to work/school on 02/04/2020 with no restrictions. If you have any questions or concerns, or if I can be of further assistance, please do not hesitate to contact me.    Sincerely,      Your [facility]

## 2020-03-30 RX ORDER — HYDROCHLOROTHIAZIDE 25 MG/1
25 TABLET ORAL DAILY
Qty: 90 TABLET | Refills: 0 | Status: SHIPPED | OUTPATIENT
Start: 2020-03-30 | End: 2020-03-31 | Stop reason: SDUPTHER

## 2020-03-31 RX ORDER — HYDROCHLOROTHIAZIDE 25 MG/1
25 TABLET ORAL DAILY
Qty: 90 TABLET | Refills: 2 | Status: SHIPPED | OUTPATIENT
Start: 2020-03-31 | End: 2023-02-06

## 2020-04-24 RX ORDER — DICLOFENAC SODIUM 75 MG/1
TABLET, DELAYED RELEASE ORAL
Qty: 60 TABLET | Refills: 2 | Status: SHIPPED | OUTPATIENT
Start: 2020-04-24

## 2020-05-13 ENCOUNTER — PATIENT OUTREACH (OUTPATIENT)
Dept: ADMINISTRATIVE | Facility: HOSPITAL | Age: 60
End: 2020-05-13

## 2020-05-25 RX ORDER — AMLODIPINE AND BENAZEPRIL HYDROCHLORIDE 5; 20 MG/1; MG/1
CAPSULE ORAL
Qty: 30 CAPSULE | Refills: 0 | Status: SHIPPED | OUTPATIENT
Start: 2020-05-25

## 2020-07-17 ENCOUNTER — TELEPHONE (OUTPATIENT)
Dept: ORTHOPEDICS | Facility: CLINIC | Age: 60
End: 2020-07-17

## 2020-07-17 ENCOUNTER — OFFICE VISIT (OUTPATIENT)
Dept: FAMILY MEDICINE | Facility: CLINIC | Age: 60
End: 2020-07-17
Payer: COMMERCIAL

## 2020-07-17 VITALS
HEART RATE: 75 BPM | DIASTOLIC BLOOD PRESSURE: 95 MMHG | BODY MASS INDEX: 38.46 KG/M2 | WEIGHT: 259.63 LBS | TEMPERATURE: 98 F | SYSTOLIC BLOOD PRESSURE: 175 MMHG | HEIGHT: 69 IN

## 2020-07-17 DIAGNOSIS — G89.29 CHRONIC LOW BACK PAIN, UNSPECIFIED BACK PAIN LATERALITY, UNSPECIFIED WHETHER SCIATICA PRESENT: Primary | ICD-10-CM

## 2020-07-17 DIAGNOSIS — M54.50 CHRONIC LOW BACK PAIN, UNSPECIFIED BACK PAIN LATERALITY, UNSPECIFIED WHETHER SCIATICA PRESENT: Primary | ICD-10-CM

## 2020-07-17 PROCEDURE — 3080F PR MOST RECENT DIASTOLIC BLOOD PRESSURE >= 90 MM HG: ICD-10-PCS | Mod: CPTII,S$GLB,, | Performed by: NURSE PRACTITIONER

## 2020-07-17 PROCEDURE — 99999 PR PBB SHADOW E&M-EST. PATIENT-LVL IV: CPT | Mod: PBBFAC,,, | Performed by: NURSE PRACTITIONER

## 2020-07-17 PROCEDURE — 99213 PR OFFICE/OUTPT VISIT, EST, LEVL III, 20-29 MIN: ICD-10-PCS | Mod: 25,S$GLB,, | Performed by: NURSE PRACTITIONER

## 2020-07-17 PROCEDURE — 96372 THER/PROPH/DIAG INJ SC/IM: CPT | Mod: S$GLB,,, | Performed by: NURSE PRACTITIONER

## 2020-07-17 PROCEDURE — 99213 OFFICE O/P EST LOW 20 MIN: CPT | Mod: 25,S$GLB,, | Performed by: NURSE PRACTITIONER

## 2020-07-17 PROCEDURE — 99999 PR PBB SHADOW E&M-EST. PATIENT-LVL IV: ICD-10-PCS | Mod: PBBFAC,,, | Performed by: NURSE PRACTITIONER

## 2020-07-17 PROCEDURE — 3077F SYST BP >= 140 MM HG: CPT | Mod: CPTII,S$GLB,, | Performed by: NURSE PRACTITIONER

## 2020-07-17 PROCEDURE — 3077F PR MOST RECENT SYSTOLIC BLOOD PRESSURE >= 140 MM HG: ICD-10-PCS | Mod: CPTII,S$GLB,, | Performed by: NURSE PRACTITIONER

## 2020-07-17 PROCEDURE — 3008F PR BODY MASS INDEX (BMI) DOCUMENTED: ICD-10-PCS | Mod: CPTII,S$GLB,, | Performed by: NURSE PRACTITIONER

## 2020-07-17 PROCEDURE — 3080F DIAST BP >= 90 MM HG: CPT | Mod: CPTII,S$GLB,, | Performed by: NURSE PRACTITIONER

## 2020-07-17 PROCEDURE — 96372 PR INJECTION,THERAP/PROPH/DIAG2ST, IM OR SUBCUT: ICD-10-PCS | Mod: S$GLB,,, | Performed by: NURSE PRACTITIONER

## 2020-07-17 PROCEDURE — 3008F BODY MASS INDEX DOCD: CPT | Mod: CPTII,S$GLB,, | Performed by: NURSE PRACTITIONER

## 2020-07-17 RX ORDER — TIZANIDINE 2 MG/1
2 TABLET ORAL EVERY 8 HOURS PRN
Qty: 15 TABLET | Refills: 0 | Status: SHIPPED | OUTPATIENT
Start: 2020-07-17 | End: 2020-07-22

## 2020-07-17 RX ORDER — KETOROLAC TROMETHAMINE 30 MG/ML
30 INJECTION, SOLUTION INTRAMUSCULAR; INTRAVENOUS
Status: COMPLETED | OUTPATIENT
Start: 2020-07-17 | End: 2020-07-17

## 2020-07-17 RX ADMIN — KETOROLAC TROMETHAMINE 30 MG: 30 INJECTION, SOLUTION INTRAMUSCULAR; INTRAVENOUS at 12:07

## 2020-07-17 NOTE — PROGRESS NOTES
Subjective:       Patient ID: Makenzie Pizarro is a 59 y.o. female.    Chief Complaint: Back Pain    Back Pain  The current episode started more than 1 year ago. The problem occurs daily. The problem is unchanged. The pain is present in the lumbar spine. The quality of the pain is described as aching. The pain radiates to the right thigh and left thigh. The pain is moderate. The symptoms are aggravated by position. Associated symptoms include numbness and tingling. Pertinent negatives include no abdominal pain, bladder incontinence, bowel incontinence, chest pain, dysuria, fever, headaches, leg pain, paresis, paresthesias, pelvic pain, perianal numbness, weakness or weight loss. Risk factors include obesity and menopause. She has tried NSAIDs (Referred to neurosurgery in 2019; did not go. MRI, xrays ordered in 2019. Taking voltaren; states toradol IM only thing that helps. Pt states no allergy to toradol, voltaren) for the symptoms. The treatment provided no relief.     Past Medical History:   Diagnosis Date    Endometriosis     Hypertension     Obesity, Class II, BMI 35-39.9, with comorbidity     Varicose veins      Social History     Socioeconomic History    Marital status: Single     Spouse name: Not on file    Number of children: Not on file    Years of education: Not on file    Highest education level: Not on file   Occupational History    Not on file   Social Needs    Financial resource strain: Not on file    Food insecurity     Worry: Not on file     Inability: Not on file    Transportation needs     Medical: Not on file     Non-medical: Not on file   Tobacco Use    Smoking status: Current Every Day Smoker     Packs/day: 0.50     Years: 35.00     Pack years: 17.50   Substance and Sexual Activity    Alcohol use: Yes     Alcohol/week: 11.7 standard drinks     Types: 14 drink(s) per week    Drug use: No    Sexual activity: Not on file   Lifestyle    Physical activity     Days per week: Not on  file     Minutes per session: Not on file    Stress: Not on file   Relationships    Social connections     Talks on phone: Not on file     Gets together: Not on file     Attends Advent service: Not on file     Active member of club or organization: Not on file     Attends meetings of clubs or organizations: Not on file     Relationship status: Not on file   Other Topics Concern    Not on file   Social History Narrative    Not on file     Past Surgical History:   Procedure Laterality Date    BILATERAL SALPINGOOPHORECTOMY      BREAST LUMPECTOMY      benign    HYSTERECTOMY         Review of Systems   Constitutional: Negative.  Negative for fever and weight loss.   HENT: Negative.    Eyes: Negative.    Respiratory: Negative.    Cardiovascular: Negative.  Negative for chest pain.   Gastrointestinal: Negative.  Negative for abdominal pain and bowel incontinence.   Endocrine: Negative.    Genitourinary: Negative.  Negative for bladder incontinence, dysuria and pelvic pain.   Musculoskeletal: Positive for back pain. Negative for leg pain.   Integumentary:  Negative.   Allergic/Immunologic: Negative.    Neurological: Positive for tingling and numbness. Negative for weakness, headaches and paresthesias.   Psychiatric/Behavioral: Negative.          Objective:      Physical Exam  Vitals signs and nursing note reviewed.   Constitutional:       Appearance: Normal appearance.   HENT:      Head: Normocephalic.      Right Ear: Tympanic membrane, ear canal and external ear normal.      Left Ear: Tympanic membrane, ear canal and external ear normal.      Nose: Nose normal.      Mouth/Throat:      Mouth: Mucous membranes are moist.      Pharynx: Oropharynx is clear.   Eyes:      Conjunctiva/sclera: Conjunctivae normal.      Pupils: Pupils are equal, round, and reactive to light.   Neck:      Musculoskeletal: Normal range of motion and neck supple.   Cardiovascular:      Rate and Rhythm: Normal rate and regular rhythm.       Pulses: Normal pulses.      Heart sounds: Normal heart sounds.   Pulmonary:      Effort: Pulmonary effort is normal.      Breath sounds: Normal breath sounds.   Abdominal:      General: Bowel sounds are normal.      Palpations: Abdomen is soft.   Musculoskeletal: Normal range of motion.      Lumbar back: She exhibits pain. She exhibits normal range of motion, no tenderness, no bony tenderness, no swelling, no edema, no deformity, no laceration, no spasm and normal pulse.   Skin:     General: Skin is warm and dry.      Capillary Refill: Capillary refill takes 2 to 3 seconds.   Neurological:      Mental Status: She is alert and oriented to person, place, and time.   Psychiatric:         Mood and Affect: Mood normal.         Behavior: Behavior normal.         Thought Content: Thought content normal.         Judgment: Judgment normal.         Assessment:       1. Chronic low back pain, unspecified back pain laterality, unspecified whether sciatica present        Plan:           Makenzie was seen today for back pain.    Diagnoses and all orders for this visit:    Chronic low back pain, unspecified back pain laterality, unspecified whether sciatica present  -     Ambulatory referral/consult to Neurosurgery; Future  -     ketorolac injection 30 mg  -     tiZANidine (ZANAFLEX) 2 MG tablet; Take 1 tablet (2 mg total) by mouth every 8 (eight) hours as needed.        Pt waited 20 min in clinic after toradol inj; no allergic reaction        Report to ER immediately if symptoms worsen

## 2020-07-21 ENCOUNTER — TELEPHONE (OUTPATIENT)
Dept: FAMILY MEDICINE | Facility: CLINIC | Age: 60
End: 2020-07-21

## 2020-07-21 ENCOUNTER — TELEPHONE (OUTPATIENT)
Dept: NEUROSURGERY | Facility: CLINIC | Age: 60
End: 2020-07-21

## 2020-07-21 NOTE — TELEPHONE ENCOUNTER
----- Message from Suzi Lu MA sent at 7/21/2020  1:45 PM CDT -----  Ashley Duque schedule is private, but I can definitely get her scheduled soon (within the next week or so). If you need further assistance scheduling in the future, please send us a message. Although, we typically address any referrals in Epic within 48 hours. Thanks for the referral!     Suzi   ----- Message -----  From: Dunia Langley LPN  Sent: 7/20/2020   2:15 PM CDT  To: Deandra ALY Staff    Would you be able to schedule pt any sooner than Sept. Please advise.

## 2020-07-22 ENCOUNTER — TELEPHONE (OUTPATIENT)
Dept: FAMILY MEDICINE | Facility: CLINIC | Age: 60
End: 2020-07-22

## 2020-07-24 ENCOUNTER — TELEPHONE (OUTPATIENT)
Dept: NEUROSURGERY | Facility: CLINIC | Age: 60
End: 2020-07-24

## 2021-02-26 ENCOUNTER — PATIENT OUTREACH (OUTPATIENT)
Dept: ADMINISTRATIVE | Facility: HOSPITAL | Age: 61
End: 2021-02-26

## 2021-03-10 ENCOUNTER — PATIENT OUTREACH (OUTPATIENT)
Dept: ADMINISTRATIVE | Facility: HOSPITAL | Age: 61
End: 2021-03-10

## 2021-11-30 ENCOUNTER — PATIENT OUTREACH (OUTPATIENT)
Dept: ADMINISTRATIVE | Facility: HOSPITAL | Age: 61
End: 2021-11-30
Payer: COMMERCIAL

## 2023-01-24 ENCOUNTER — OFFICE VISIT (OUTPATIENT)
Dept: FAMILY MEDICINE | Facility: CLINIC | Age: 63
End: 2023-01-24
Payer: COMMERCIAL

## 2023-01-24 ENCOUNTER — LAB VISIT (OUTPATIENT)
Dept: LAB | Facility: HOSPITAL | Age: 63
End: 2023-01-24
Attending: NURSE PRACTITIONER
Payer: COMMERCIAL

## 2023-01-24 VITALS
RESPIRATION RATE: 18 BRPM | WEIGHT: 246.94 LBS | TEMPERATURE: 97 F | DIASTOLIC BLOOD PRESSURE: 94 MMHG | SYSTOLIC BLOOD PRESSURE: 161 MMHG | BODY MASS INDEX: 36.57 KG/M2 | HEIGHT: 69 IN | HEART RATE: 78 BPM

## 2023-01-24 DIAGNOSIS — L03.90 CELLULITIS, UNSPECIFIED CELLULITIS SITE: ICD-10-CM

## 2023-01-24 DIAGNOSIS — M25.50 ARTHRALGIA, UNSPECIFIED JOINT: ICD-10-CM

## 2023-01-24 DIAGNOSIS — Z09 FOLLOW-UP EXAM: Primary | ICD-10-CM

## 2023-01-24 DIAGNOSIS — M87.00 AVN (AVASCULAR NECROSIS OF BONE): ICD-10-CM

## 2023-01-24 DIAGNOSIS — M79.89 SWELLING OF LEFT HAND: ICD-10-CM

## 2023-01-24 PROCEDURE — 4010F ACE/ARB THERAPY RXD/TAKEN: CPT | Mod: CPTII,S$GLB,, | Performed by: NURSE PRACTITIONER

## 2023-01-24 PROCEDURE — 86038 ANTINUCLEAR ANTIBODIES: CPT | Performed by: NURSE PRACTITIONER

## 2023-01-24 PROCEDURE — 3008F PR BODY MASS INDEX (BMI) DOCUMENTED: ICD-10-PCS | Mod: CPTII,S$GLB,, | Performed by: NURSE PRACTITIONER

## 2023-01-24 PROCEDURE — 86225 DNA ANTIBODY NATIVE: CPT | Performed by: NURSE PRACTITIONER

## 2023-01-24 PROCEDURE — 3080F DIAST BP >= 90 MM HG: CPT | Mod: CPTII,S$GLB,, | Performed by: NURSE PRACTITIONER

## 2023-01-24 PROCEDURE — 36415 COLL VENOUS BLD VENIPUNCTURE: CPT | Mod: PO | Performed by: NURSE PRACTITIONER

## 2023-01-24 PROCEDURE — 4010F PR ACE/ARB THEARPY RXD/TAKEN: ICD-10-PCS | Mod: CPTII,S$GLB,, | Performed by: NURSE PRACTITIONER

## 2023-01-24 PROCEDURE — 1159F PR MEDICATION LIST DOCUMENTED IN MEDICAL RECORD: ICD-10-PCS | Mod: CPTII,S$GLB,, | Performed by: NURSE PRACTITIONER

## 2023-01-24 PROCEDURE — 86235 NUCLEAR ANTIGEN ANTIBODY: CPT | Mod: 59 | Performed by: NURSE PRACTITIONER

## 2023-01-24 PROCEDURE — 99213 PR OFFICE/OUTPT VISIT, EST, LEVL III, 20-29 MIN: ICD-10-PCS | Mod: S$GLB,,, | Performed by: NURSE PRACTITIONER

## 2023-01-24 PROCEDURE — 99213 OFFICE O/P EST LOW 20 MIN: CPT | Mod: S$GLB,,, | Performed by: NURSE PRACTITIONER

## 2023-01-24 PROCEDURE — 3080F PR MOST RECENT DIASTOLIC BLOOD PRESSURE >= 90 MM HG: ICD-10-PCS | Mod: CPTII,S$GLB,, | Performed by: NURSE PRACTITIONER

## 2023-01-24 PROCEDURE — 1160F PR REVIEW ALL MEDS BY PRESCRIBER/CLIN PHARMACIST DOCUMENTED: ICD-10-PCS | Mod: CPTII,S$GLB,, | Performed by: NURSE PRACTITIONER

## 2023-01-24 PROCEDURE — 1159F MED LIST DOCD IN RCRD: CPT | Mod: CPTII,S$GLB,, | Performed by: NURSE PRACTITIONER

## 2023-01-24 PROCEDURE — 99999 PR PBB SHADOW E&M-EST. PATIENT-LVL V: ICD-10-PCS | Mod: PBBFAC,,, | Performed by: NURSE PRACTITIONER

## 2023-01-24 PROCEDURE — 3008F BODY MASS INDEX DOCD: CPT | Mod: CPTII,S$GLB,, | Performed by: NURSE PRACTITIONER

## 2023-01-24 PROCEDURE — 3077F PR MOST RECENT SYSTOLIC BLOOD PRESSURE >= 140 MM HG: ICD-10-PCS | Mod: CPTII,S$GLB,, | Performed by: NURSE PRACTITIONER

## 2023-01-24 PROCEDURE — 99999 PR PBB SHADOW E&M-EST. PATIENT-LVL V: CPT | Mod: PBBFAC,,, | Performed by: NURSE PRACTITIONER

## 2023-01-24 PROCEDURE — 3077F SYST BP >= 140 MM HG: CPT | Mod: CPTII,S$GLB,, | Performed by: NURSE PRACTITIONER

## 2023-01-24 PROCEDURE — 86039 ANTINUCLEAR ANTIBODIES (ANA): CPT | Performed by: NURSE PRACTITIONER

## 2023-01-24 PROCEDURE — 1160F RVW MEDS BY RX/DR IN RCRD: CPT | Mod: CPTII,S$GLB,, | Performed by: NURSE PRACTITIONER

## 2023-01-24 PROCEDURE — 86431 RHEUMATOID FACTOR QUANT: CPT | Performed by: NURSE PRACTITIONER

## 2023-01-24 RX ORDER — METHYLPREDNISOLONE 4 MG/1
4 TABLET ORAL
COMMUNITY

## 2023-01-24 NOTE — PROGRESS NOTES
Subjective:       Patient ID: Makenzie Pizarro is a 62 y.o. female.    Chief Complaint: Hand Pain (Left middle finger, swollen, painful, discharged Sunday from [facility], had IV a/b therapy there.)    Hand Pain   Incident onset: ER f/u; seen at [facility] on 1/20/2023. There was no injury mechanism. The pain is present in the left hand. The quality of the pain is described as aching. The pain does not radiate. The pain is moderate. The pain has been Intermittent since the incident. Pertinent negatives include no chest pain, muscle weakness, numbness or tingling. The symptoms are aggravated by movement and palpation. Treatments tried: Pt given IV vancomycin, rocephin at ER. Discharged with rx for bactrim, medrol; currently taking. Uric acid done at ER; WNL. Xray left hand showed AVN (see results below) The treatment provided mild relief.   ORIGINAL REPORT ========   [facility] XR HAND 3+ VW RIGHT     PROCEDURE: [facility] XR HAND 3+ VW RIGHT     INDICATION: HAND PAIN.   .     FINDINGS:   There is swelling of the long finger. Large dorsal osteophytes are present at the PIP. Questionable erosion at the ulnar aspect of the head of the proximal phalanx of the long finger.   No acute fracture or subluxation.   To lesser degree, degenerative changes and osteophytes are present at the fourth PIP as well.   There is some sclerosis within the lunate suggesting partial AVN of the lunate. Other bones are within normal limits.  Addendum    Addendum by Joe Davis MD on 01/22/2023 11:00 AM CST   ======== ADDENDUM #1 ========   X-ray is labeled right hand, the House Supervisor reports that the   technologist reports that the LEFT hand was x-rayed because the left hand   was a symptomatic hand at the time. Thus, I believe this is actually a   LEFT hand. If there is still concern that the imaged hand is incorrect,   repeat x-ray could be done.     Electronically Signed By: Joe Davis MD 1/22/2023 11:00 CST    Past Medical  History:   Diagnosis Date    Endometriosis     Hypertension     Obesity, Class II, BMI 35-39.9, with comorbidity     Varicose veins      Social History     Socioeconomic History    Marital status: Single   Tobacco Use    Smoking status: Every Day     Packs/day: 0.50     Years: 35.00     Pack years: 17.50     Types: Cigarettes   Substance and Sexual Activity    Alcohol use: Yes     Alcohol/week: 11.7 standard drinks     Types: 14 drink(s) per week    Drug use: No     Past Surgical History:   Procedure Laterality Date    BILATERAL SALPINGOOPHORECTOMY      BREAST LUMPECTOMY      benign    HYSTERECTOMY         Review of Systems   Constitutional: Negative.    HENT: Negative.     Eyes: Negative.    Respiratory: Negative.     Cardiovascular: Negative.  Negative for chest pain.   Gastrointestinal: Negative.    Endocrine: Negative.    Genitourinary: Negative.    Musculoskeletal:         Left hand swelling, pain   Integumentary:  Negative.   Allergic/Immunologic: Negative.    Neurological: Negative.  Negative for tingling and numbness.   Psychiatric/Behavioral: Negative.         Objective:      Physical Exam  Vitals and nursing note reviewed.   Constitutional:       Appearance: Normal appearance.   HENT:      Head: Normocephalic.      Right Ear: Tympanic membrane, ear canal and external ear normal.      Left Ear: Tympanic membrane, ear canal and external ear normal.      Nose: Nose normal.      Mouth/Throat:      Mouth: Mucous membranes are moist.      Pharynx: Oropharynx is clear.   Eyes:      Conjunctiva/sclera: Conjunctivae normal.      Pupils: Pupils are equal, round, and reactive to light.   Cardiovascular:      Rate and Rhythm: Normal rate and regular rhythm.      Pulses: Normal pulses.      Heart sounds: Normal heart sounds.   Pulmonary:      Effort: Pulmonary effort is normal.      Breath sounds: Normal breath sounds.   Abdominal:      General: Bowel sounds are normal.      Palpations: Abdomen is soft.    Musculoskeletal:      Left hand: Swelling, tenderness and bony tenderness present. No deformity or lacerations. Normal range of motion. Normal strength. Normal sensation. There is no disruption of two-point discrimination. Normal capillary refill. Normal pulse.      Cervical back: Normal range of motion and neck supple.   Skin:     General: Skin is warm and dry.      Capillary Refill: Capillary refill takes 2 to 3 seconds.   Neurological:      Mental Status: She is alert and oriented to person, place, and time.   Psychiatric:         Mood and Affect: Mood normal.         Behavior: Behavior normal.         Thought Content: Thought content normal.         Judgment: Judgment normal.       Assessment:       Problem List Items Addressed This Visit    None  Visit Diagnoses       Follow-up exam    -  Primary    ER      Swelling of left hand        Relevant Orders    Ambulatory referral/consult to Orthopedics    AVN (avascular necrosis of bone)        Relevant Orders    Ambulatory referral/consult to Orthopedics    Arthralgia, unspecified joint        Relevant Orders    RHEUMATOID FACTOR    ADEN Screen w/Reflex              Plan:           Makenzie was seen today for hand pain.    Diagnoses and all orders for this visit:    Follow-up exam  Comments:  ER  Cellulitis, unspecified cellulitis site  Swelling of left hand  AVN (avascular necrosis of bone)  Arthralgia, unspecified joint  -     Ambulatory referral/consult to Orthopedics; Future  -     RHEUMATOID FACTOR; Future  -     ADEN Screen w/Reflex; Future  Continue current medication  Report to ER immediately if symptoms worsen or persist

## 2023-01-25 ENCOUNTER — OFFICE VISIT (OUTPATIENT)
Dept: ORTHOPEDICS | Facility: CLINIC | Age: 63
End: 2023-01-25
Payer: COMMERCIAL

## 2023-01-25 ENCOUNTER — HOSPITAL ENCOUNTER (OUTPATIENT)
Dept: RADIOLOGY | Facility: HOSPITAL | Age: 63
Discharge: HOME | End: 2023-01-25
Attending: PHYSICIAN ASSISTANT
Payer: COMMERCIAL

## 2023-01-25 ENCOUNTER — TELEPHONE (OUTPATIENT)
Dept: FAMILY MEDICINE | Facility: CLINIC | Age: 63
End: 2023-01-25
Payer: COMMERCIAL

## 2023-01-25 VITALS — WEIGHT: 246.94 LBS | BODY MASS INDEX: 36.57 KG/M2 | HEIGHT: 69 IN

## 2023-01-25 DIAGNOSIS — M25.50 ARTHRALGIA, UNSPECIFIED JOINT: ICD-10-CM

## 2023-01-25 DIAGNOSIS — Z12.31 OTHER SCREENING MAMMOGRAM: ICD-10-CM

## 2023-01-25 DIAGNOSIS — M87.00 AVN (AVASCULAR NECROSIS OF BONE): ICD-10-CM

## 2023-01-25 DIAGNOSIS — M79.89 SWELLING OF LEFT HAND: Primary | ICD-10-CM

## 2023-01-25 DIAGNOSIS — R76.8 POSITIVE ANA (ANTINUCLEAR ANTIBODY): Primary | ICD-10-CM

## 2023-01-25 DIAGNOSIS — M79.89 SWELLING OF LEFT HAND: ICD-10-CM

## 2023-01-25 LAB
ANA PATTERN 1: NORMAL
ANA SER QL IF: POSITIVE
ANA TITR SER IF: NORMAL {TITER}
RHEUMATOID FACT SERPL-ACNC: <13 IU/ML (ref 0–15)

## 2023-01-25 PROCEDURE — 73130 XR HAND COMPLETE 3 VIEW LEFT: ICD-10-PCS | Mod: 26,LT,, | Performed by: RADIOLOGY

## 2023-01-25 PROCEDURE — 99999 PR PBB SHADOW E&M-EST. PATIENT-LVL III: CPT | Mod: PBBFAC,,, | Performed by: PHYSICIAN ASSISTANT

## 2023-01-25 PROCEDURE — 99204 OFFICE O/P NEW MOD 45 MIN: CPT | Mod: S$GLB,,, | Performed by: PHYSICIAN ASSISTANT

## 2023-01-25 PROCEDURE — 1159F PR MEDICATION LIST DOCUMENTED IN MEDICAL RECORD: ICD-10-PCS | Mod: CPTII,S$GLB,, | Performed by: PHYSICIAN ASSISTANT

## 2023-01-25 PROCEDURE — 3008F PR BODY MASS INDEX (BMI) DOCUMENTED: ICD-10-PCS | Mod: CPTII,S$GLB,, | Performed by: PHYSICIAN ASSISTANT

## 2023-01-25 PROCEDURE — 1160F PR REVIEW ALL MEDS BY PRESCRIBER/CLIN PHARMACIST DOCUMENTED: ICD-10-PCS | Mod: CPTII,S$GLB,, | Performed by: PHYSICIAN ASSISTANT

## 2023-01-25 PROCEDURE — 4010F PR ACE/ARB THEARPY RXD/TAKEN: ICD-10-PCS | Mod: CPTII,S$GLB,, | Performed by: PHYSICIAN ASSISTANT

## 2023-01-25 PROCEDURE — 99999 PR PBB SHADOW E&M-EST. PATIENT-LVL III: ICD-10-PCS | Mod: PBBFAC,,, | Performed by: PHYSICIAN ASSISTANT

## 2023-01-25 PROCEDURE — 4010F ACE/ARB THERAPY RXD/TAKEN: CPT | Mod: CPTII,S$GLB,, | Performed by: PHYSICIAN ASSISTANT

## 2023-01-25 PROCEDURE — 73130 X-RAY EXAM OF HAND: CPT | Mod: 26,LT,, | Performed by: RADIOLOGY

## 2023-01-25 PROCEDURE — 1159F MED LIST DOCD IN RCRD: CPT | Mod: CPTII,S$GLB,, | Performed by: PHYSICIAN ASSISTANT

## 2023-01-25 PROCEDURE — 3008F BODY MASS INDEX DOCD: CPT | Mod: CPTII,S$GLB,, | Performed by: PHYSICIAN ASSISTANT

## 2023-01-25 PROCEDURE — 73130 X-RAY EXAM OF HAND: CPT | Mod: TC,PO,LT

## 2023-01-25 PROCEDURE — 1160F RVW MEDS BY RX/DR IN RCRD: CPT | Mod: CPTII,S$GLB,, | Performed by: PHYSICIAN ASSISTANT

## 2023-01-25 PROCEDURE — 99204 PR OFFICE/OUTPT VISIT, NEW, LEVL IV, 45-59 MIN: ICD-10-PCS | Mod: S$GLB,,, | Performed by: PHYSICIAN ASSISTANT

## 2023-01-25 NOTE — PROGRESS NOTES
1/25/2023    Chief Complaint:  No chief complaint on file.      HPI:  Makenzie Pizarro is a 62 y.o. female, who presents to clinic today for evaluation of her left middle finger swelling and pain.  States approximately 1 week ago she noticed swelling and pain of the left middle finger.  States it did not get better over a few days, which prompted her to report to [facility].  States she was admitted over the weekend and placed on IV antibiotics.  States she was discharged with antibiotics and a steroid.  States she was discharged and followed up with her primary care provider.  States her primary care provider referred her to our clinic.  Denies any acute injury or puncture wound of the left middle finger/hand.  Denies any other complaints this time.    PMHX:  Past Medical History:   Diagnosis Date    Endometriosis     Hypertension     Obesity, Class II, BMI 35-39.9, with comorbidity     Varicose veins        PSHX:  Past Surgical History:   Procedure Laterality Date    BILATERAL SALPINGOOPHORECTOMY      BREAST LUMPECTOMY      benign    HYSTERECTOMY         FMHX:  Family History   Problem Relation Age of Onset    Hypertension Mother     Diabetes Mother     Hypertension Father     Diabetes Father     Lung cancer Paternal Grandfather     Leukemia Paternal Uncle     Lung cancer Maternal Uncle        SOCHX:  Social History     Tobacco Use    Smoking status: Every Day     Packs/day: 0.50     Years: 35.00     Pack years: 17.50     Types: Cigarettes    Smokeless tobacco: Not on file   Substance Use Topics    Alcohol use: Yes     Alcohol/week: 11.7 standard drinks     Types: 14 drink(s) per week       ALLERGIES:  Naproxen    CURRENT MEDICATIONS:  Current Outpatient Medications on File Prior to Visit   Medication Sig Dispense Refill    amlodipine-benazepril 5-20 mg (LOTREL) 5-20 mg per capsule TAKE 1 CAPSULE BY MOUTH EVERY DAY 30 capsule 0    diclofenac (VOLTAREN) 75 MG EC tablet TAKE 1 TABLET BY MOUTH  TWICE A DAY 60 tablet 2    hydroCHLOROthiazide (HYDRODIURIL) 25 MG tablet TAKE 1 TABLET (25 MG TOTAL) BY MOUTH ONCE DAILY. 90 tablet 2    methylPREDNISolone (MEDROL DOSEPACK) 4 mg tablet Take 4 mg by mouth. use as directed      traMADol (ULTRAM) 50 mg tablet TAKE 1 TABLET BY MOUTH EVERY 6 HOURS AS NEEDED FOR PAIN FOR UP TO 5 DAYS  0     No current facility-administered medications on file prior to visit.       REVIEW OF SYSTEMS:  Review of Systems   Musculoskeletal:  Positive for joint pain.     GENERAL PHYSICAL EXAM:   There were no vitals taken for this visit.   GEN: well developed, well nourished, no acute distress   HENT: Normocephalic, atraumatic   EYES: No discharge, conjunctiva normal   NECK: Supple, non-tender   PULM: No wheezing, no respiratory distress   CV: RRR   ABD: Soft, non-tender    ORTHO EXAM:   Examination of the left hand reveals uniform swelling of the left middle finger.  No erythema, fluctuance, drainage, purulence or any other signs of gross infection.  No swelling noted in the remainder of the left hand.  No significant tenderness to palpation of the left wrist/hand.  No tenderness to palpation of the left thumb, index, ring, or little fingers.  No significant tenderness to palpation of the MCP joint or DIP joint of the left middle finger.  Mild tenderness to palpation of the flexor tendon sheath at the level of the A1 pulley, middle phalanx, and distal phalanx.  Severe tenderness palpation of the flexor tendon sheath over the level of the proximal phalanx.  Severe tenderness to palpation of the PIP joint.  Severe tenderness with forced flexion of the left middle finger.  Severe tenderness with hyperextension of the left middle finger.  No tenderness with mild flexion/extension of the DIP joint or MCP joint.  Strength not tested secondary to pain.  Sensation is grossly intact of the left middle finger.  Sensation is grossly intact in the radial, ulnar, median nerve distributions.  Capillary  refill less than 2 seconds in all fingers.    RADIOLOGY:   X-rays of the left hand were taken today in clinic.  X-rays reviewed by myself.  Imaging showed no acute fracture or dislocation.  No subluxation.  Presence of degenerative changes throughout the IP joints of the left hand.  No evidence of avascular necrosis noted of the carpal bones of the left wrist.  Presence of some erosive changes over the ulnar portion of both the metacarpal and proximal phalanx of the MCP joint of the left middle finger.  Presence of soft tissue swelling around the PIP joint of the left middle finger.  No osseous destructive processes noted.  No other significant bony abnormalities noted.    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    PLAN:  1. I discussed with Makenzie Pizarro the importance of taking her Bactrim antibiotic twice a day every day until she is completed the antibiotic course.  We did discuss the importance of not missing a dose.  We discussed we would have her follow up in 2 days for repeat evaluation to further evaluate the left middle finger.  We discussed for any worsening swelling, pain, erythema, fluctuance, drainage, or any other signs of infection she should report to emergency department immediately.  We discussed for any systemic symptoms such as fever, chills, fatigue, or malaise she should report to nearest emergency department.  She verbalized understanding and verbally agreed with the treatment plan.      2. She was instructed multiple times to take her Bactrim DS antibiotic as prescribed.  She verbalized understanding.      3. I would like her follow up in clinic in 2 days for repeat evaluation.  She was instructed to contact clinic for any problems or concerns in the interim.

## 2023-01-26 ENCOUNTER — TELEPHONE (OUTPATIENT)
Dept: FAMILY MEDICINE | Facility: CLINIC | Age: 63
End: 2023-01-26
Payer: COMMERCIAL

## 2023-01-26 NOTE — TELEPHONE ENCOUNTER
Please call this pt today to check on her progress. I have called her twice this AM with the number provided on her chart and have gotten on answer.

## 2023-01-27 ENCOUNTER — OFFICE VISIT (OUTPATIENT)
Dept: ORTHOPEDICS | Facility: CLINIC | Age: 63
End: 2023-01-27
Payer: COMMERCIAL

## 2023-01-27 ENCOUNTER — TELEPHONE (OUTPATIENT)
Dept: RHEUMATOLOGY | Facility: CLINIC | Age: 63
End: 2023-01-27
Payer: COMMERCIAL

## 2023-01-27 VITALS — HEIGHT: 69 IN | BODY MASS INDEX: 36.43 KG/M2 | WEIGHT: 246 LBS

## 2023-01-27 DIAGNOSIS — M65.9 FLEXOR TENOSYNOVITIS OF FINGER: Primary | ICD-10-CM

## 2023-01-27 LAB
ANTI SM ANTIBODY: 0.09 RATIO (ref 0–0.99)
ANTI SM/RNP ANTIBODY: 0.17 RATIO (ref 0–0.99)
ANTI-SM INTERPRETATION: NEGATIVE
ANTI-SM/RNP INTERPRETATION: NEGATIVE
ANTI-SSA ANTIBODY: 0.09 RATIO (ref 0–0.99)
ANTI-SSA INTERPRETATION: NEGATIVE
ANTI-SSB ANTIBODY: 0.08 RATIO (ref 0–0.99)
ANTI-SSB INTERPRETATION: NEGATIVE
DSDNA AB SER-ACNC: NORMAL [IU]/ML

## 2023-01-27 PROCEDURE — 3008F BODY MASS INDEX DOCD: CPT | Mod: CPTII,S$GLB,, | Performed by: PHYSICIAN ASSISTANT

## 2023-01-27 PROCEDURE — 99213 PR OFFICE/OUTPT VISIT, EST, LEVL III, 20-29 MIN: ICD-10-PCS | Mod: S$GLB,,, | Performed by: PHYSICIAN ASSISTANT

## 2023-01-27 PROCEDURE — 99999 PR PBB SHADOW E&M-EST. PATIENT-LVL III: ICD-10-PCS | Mod: PBBFAC,,, | Performed by: PHYSICIAN ASSISTANT

## 2023-01-27 PROCEDURE — 99999 PR PBB SHADOW E&M-EST. PATIENT-LVL III: CPT | Mod: PBBFAC,,, | Performed by: PHYSICIAN ASSISTANT

## 2023-01-27 PROCEDURE — 4010F ACE/ARB THERAPY RXD/TAKEN: CPT | Mod: CPTII,S$GLB,, | Performed by: PHYSICIAN ASSISTANT

## 2023-01-27 PROCEDURE — 1159F PR MEDICATION LIST DOCUMENTED IN MEDICAL RECORD: ICD-10-PCS | Mod: CPTII,S$GLB,, | Performed by: PHYSICIAN ASSISTANT

## 2023-01-27 PROCEDURE — 99213 OFFICE O/P EST LOW 20 MIN: CPT | Mod: S$GLB,,, | Performed by: PHYSICIAN ASSISTANT

## 2023-01-27 PROCEDURE — 1159F MED LIST DOCD IN RCRD: CPT | Mod: CPTII,S$GLB,, | Performed by: PHYSICIAN ASSISTANT

## 2023-01-27 PROCEDURE — 1160F PR REVIEW ALL MEDS BY PRESCRIBER/CLIN PHARMACIST DOCUMENTED: ICD-10-PCS | Mod: CPTII,S$GLB,, | Performed by: PHYSICIAN ASSISTANT

## 2023-01-27 PROCEDURE — 4010F PR ACE/ARB THEARPY RXD/TAKEN: ICD-10-PCS | Mod: CPTII,S$GLB,, | Performed by: PHYSICIAN ASSISTANT

## 2023-01-27 PROCEDURE — 1160F RVW MEDS BY RX/DR IN RCRD: CPT | Mod: CPTII,S$GLB,, | Performed by: PHYSICIAN ASSISTANT

## 2023-01-27 PROCEDURE — 3008F PR BODY MASS INDEX (BMI) DOCUMENTED: ICD-10-PCS | Mod: CPTII,S$GLB,, | Performed by: PHYSICIAN ASSISTANT

## 2023-01-27 RX ORDER — SULFAMETHOXAZOLE AND TRIMETHOPRIM 800; 160 MG/1; MG/1
1 TABLET ORAL 2 TIMES DAILY
Qty: 14 TABLET | Refills: 0 | Status: SHIPPED | OUTPATIENT
Start: 2023-01-27 | End: 2023-02-03 | Stop reason: SDUPTHER

## 2023-01-27 RX ORDER — SULFAMETHOXAZOLE AND TRIMETHOPRIM 800; 160 MG/1; MG/1
1 TABLET ORAL 2 TIMES DAILY
COMMUNITY
Start: 2023-01-22 | End: 2023-01-27 | Stop reason: SDUPTHER

## 2023-01-27 RX ORDER — METHOCARBAMOL 500 MG/1
TABLET, FILM COATED ORAL
COMMUNITY
Start: 2023-01-02

## 2023-01-27 NOTE — TELEPHONE ENCOUNTER
Spoke to pt and adv not taking new pts at this time. The referral is in system and adv would be added to wtl. Also offered [place], [place] or [place].

## 2023-01-27 NOTE — TELEPHONE ENCOUNTER
----- Message from Jerica Leon sent at 1/27/2023  1:04 PM CST -----  Contact: Makenzie Banegas is needing a call back to schedule her referral for R76.8 (ICD-10-CM) - Positive ADEN (antinuclear antibody), and M25.50 (ICD-10-CM) - Arthralgia, unspecified joint. Please call her back   At [phone], she prefers [employer].

## 2023-01-27 NOTE — TELEPHONE ENCOUNTER
----- Message from Toshia Payne NP sent at 1/25/2023  2:18 PM CST -----  Reviewed; ADEN positive, indicating possible lupus; rheumatology referral recommended for further evaluation.

## 2023-01-30 ENCOUNTER — OFFICE VISIT (OUTPATIENT)
Dept: ORTHOPEDICS | Facility: CLINIC | Age: 63
End: 2023-01-30
Payer: COMMERCIAL

## 2023-01-30 VITALS — WEIGHT: 246.06 LBS | HEIGHT: 69 IN | BODY MASS INDEX: 36.44 KG/M2

## 2023-01-30 DIAGNOSIS — M65.9 FLEXOR TENOSYNOVITIS OF FINGER: Primary | ICD-10-CM

## 2023-01-30 PROCEDURE — 3008F PR BODY MASS INDEX (BMI) DOCUMENTED: ICD-10-PCS | Mod: CPTII,S$GLB,, | Performed by: PHYSICIAN ASSISTANT

## 2023-01-30 PROCEDURE — 4010F ACE/ARB THERAPY RXD/TAKEN: CPT | Mod: CPTII,S$GLB,, | Performed by: PHYSICIAN ASSISTANT

## 2023-01-30 PROCEDURE — 1159F MED LIST DOCD IN RCRD: CPT | Mod: CPTII,S$GLB,, | Performed by: PHYSICIAN ASSISTANT

## 2023-01-30 PROCEDURE — 1160F RVW MEDS BY RX/DR IN RCRD: CPT | Mod: CPTII,S$GLB,, | Performed by: PHYSICIAN ASSISTANT

## 2023-01-30 PROCEDURE — 4010F PR ACE/ARB THEARPY RXD/TAKEN: ICD-10-PCS | Mod: CPTII,S$GLB,, | Performed by: PHYSICIAN ASSISTANT

## 2023-01-30 PROCEDURE — 99999 PR PBB SHADOW E&M-EST. PATIENT-LVL III: CPT | Mod: PBBFAC,,, | Performed by: PHYSICIAN ASSISTANT

## 2023-01-30 PROCEDURE — 3008F BODY MASS INDEX DOCD: CPT | Mod: CPTII,S$GLB,, | Performed by: PHYSICIAN ASSISTANT

## 2023-01-30 PROCEDURE — 1160F PR REVIEW ALL MEDS BY PRESCRIBER/CLIN PHARMACIST DOCUMENTED: ICD-10-PCS | Mod: CPTII,S$GLB,, | Performed by: PHYSICIAN ASSISTANT

## 2023-01-30 PROCEDURE — 1159F PR MEDICATION LIST DOCUMENTED IN MEDICAL RECORD: ICD-10-PCS | Mod: CPTII,S$GLB,, | Performed by: PHYSICIAN ASSISTANT

## 2023-01-30 PROCEDURE — 99213 PR OFFICE/OUTPT VISIT, EST, LEVL III, 20-29 MIN: ICD-10-PCS | Mod: S$GLB,,, | Performed by: PHYSICIAN ASSISTANT

## 2023-01-30 PROCEDURE — 99999 PR PBB SHADOW E&M-EST. PATIENT-LVL III: ICD-10-PCS | Mod: PBBFAC,,, | Performed by: PHYSICIAN ASSISTANT

## 2023-01-30 PROCEDURE — 99213 OFFICE O/P EST LOW 20 MIN: CPT | Mod: S$GLB,,, | Performed by: PHYSICIAN ASSISTANT

## 2023-01-30 NOTE — PROGRESS NOTES
1/30/2023    HPI:  Makenzie Pizarro is a 62 y.o. female, who presents to clinic today for continued evaluation of her left middle finger flexor tenosynovitis.  States she is been taking antibiotics as instructed.  States pain on average is 6/10.  States pain is worse with use of the finger.  States she believes it has not significantly improved since her last visit.  Denies any other complaints this time.    PMHX:  Past Medical History:   Diagnosis Date    Endometriosis     Hypertension     Obesity, Class II, BMI 35-39.9, with comorbidity     Varicose veins        PSHX:  Past Surgical History:   Procedure Laterality Date    BILATERAL SALPINGOOPHORECTOMY      BREAST LUMPECTOMY      benign    HYSTERECTOMY         FMHX:  Family History   Problem Relation Age of Onset    Hypertension Mother     Diabetes Mother     Hypertension Father     Diabetes Father     Lung cancer Paternal Grandfather     Leukemia Paternal Uncle     Lung cancer Maternal Uncle        SOCHX:  Social History     Tobacco Use    Smoking status: Every Day     Packs/day: 0.50     Years: 35.00     Pack years: 17.50     Types: Cigarettes    Smokeless tobacco: Not on file   Substance Use Topics    Alcohol use: Yes     Alcohol/week: 11.7 standard drinks     Types: 14 drink(s) per week       ALLERGIES:  Naproxen    CURRENT MEDICATIONS:  Current Outpatient Medications on File Prior to Visit   Medication Sig Dispense Refill    amlodipine-benazepril 5-20 mg (LOTREL) 5-20 mg per capsule TAKE 1 CAPSULE BY MOUTH EVERY DAY 30 capsule 0    diclofenac (VOLTAREN) 75 MG EC tablet TAKE 1 TABLET BY MOUTH TWICE A DAY 60 tablet 2    methocarbamoL (ROBAXIN) 500 MG Tab Take by mouth.      methylPREDNISolone (MEDROL DOSEPACK) 4 mg tablet Take 4 mg by mouth. use as directed      traMADol (ULTRAM) 50 mg tablet TAKE 1 TABLET BY MOUTH EVERY 6 HOURS AS NEEDED FOR PAIN FOR UP TO 5 DAYS  0    hydroCHLOROthiazide (HYDRODIURIL) 25 MG tablet TAKE 1 TABLET (25 MG TOTAL) BY MOUTH ONCE  "DAILY. 90 tablet 2     No current facility-administered medications on file prior to visit.       REVIEW OF SYSTEMS:  Review of Systems Complete; Negative, unless noted above.    GENERAL PHYSICAL EXAM:   Ht 5' 9" (1.753 m)   Wt 111.6 kg (246 lb)   BMI 36.33 kg/m²    GEN: well developed, well nourished, no acute distress   PULM: No wheezing, no respiratory distress   CV: RRR    ORTHO EXAM:   Examination of the left middle finger reveals uniform swelling of the left middle finger.  No significant change in appearance when compared to the previous physical examination.  No erythema, fluctuance, drainage, purulence, or any other signs of gross infection.  No tenderness to palpation of the flexor tendon sheath overlying the area of the A1 pulley of the left middle finger.  Minimal tenderness palpation of the flexor tendon sheath the distal phalanx.  Marked tenderness palpation overlying the area of the proximal phalanx and middle phalanx of the flexor tendon sheath.  Range of motion not tested secondary to pain.  Strength not tested secondary to pain.  Sensation is grossly intact left little finger.  Capillary refill less than 2 seconds.    RADIOLOGY:   None.    ASSESSMENT:   Left middle finger flexor tenosynovitis    PLAN:  1. I discussed with Makenzie Pizarro that considering she has not significantly worsened since her last visit, I will have her follow up in clinic in 3 days for repeat evaluation.  We discussed importance of continuing to monitor symptoms and for any worsening of her symptoms to report to the [place] [facility].  We discussed for any systemic symptoms such as fever, fatigue, or malaise to report to the [place] [facility].  We discussed in great detail the importance of continuing to take her antibiotics.  She verbalized understanding and verbally agreed with the treatment plan.    2.  I would like her follow up in clinic in 3 days for repeat evaluation.  She was " instructed to contact the clinic for any problems or concerns in the interim.

## 2023-01-30 NOTE — LETTER
January 30, 2023      [facility]  [address]  [place] [address]  Phone: [phone]       Patient: Makenzie Pizarro   YOB: 1960  Date of Visit: 01/30/2023    To Whom It May Concern:    NOHEMI Pizarro  was at [facility] on 01/30/2023. The patient may return to work/school on 1/31/2022 with restrictions: no use of left hand pending results of follow up appointment on 2/3/2023 . If you have any questions or concerns, or if I can be of further assistance, please do not hesitate to contact me.    Sincerely,    Zach Mary PA-C

## 2023-01-30 NOTE — TELEPHONE ENCOUNTER
Called pt, gave number to [facility] main line of [phone] for her to call to schedule Rheumatology appt

## 2023-01-30 NOTE — PROGRESS NOTES
1/30/2023    HPI:  Makenzie Pizarro is a 62 y.o. female, who presents to clinic today for continued evaluation of her left middle finger flexor tenosynovitis.  States she did not start taking her antibiotic until last Thursday, as she was concerned about side effect of diarrhea.  States she does believe that her left middle finger has improved since her last visit.  States her pain has improved since last visit.  Denies any other complaints at this time.    PMHX:  Past Medical History:   Diagnosis Date    Endometriosis     Hypertension     Obesity, Class II, BMI 35-39.9, with comorbidity     Varicose veins        PSHX:  Past Surgical History:   Procedure Laterality Date    BILATERAL SALPINGOOPHORECTOMY      BREAST LUMPECTOMY      benign    HYSTERECTOMY         FMHX:  Family History   Problem Relation Age of Onset    Hypertension Mother     Diabetes Mother     Hypertension Father     Diabetes Father     Lung cancer Paternal Grandfather     Leukemia Paternal Uncle     Lung cancer Maternal Uncle        SOCHX:  Social History     Tobacco Use    Smoking status: Every Day     Packs/day: 0.50     Years: 35.00     Pack years: 17.50     Types: Cigarettes    Smokeless tobacco: Not on file   Substance Use Topics    Alcohol use: Yes     Alcohol/week: 11.7 standard drinks     Types: 14 drink(s) per week       ALLERGIES:  Naproxen    CURRENT MEDICATIONS:  Current Outpatient Medications on File Prior to Visit   Medication Sig Dispense Refill    amlodipine-benazepril 5-20 mg (LOTREL) 5-20 mg per capsule TAKE 1 CAPSULE BY MOUTH EVERY DAY 30 capsule 0    diclofenac (VOLTAREN) 75 MG EC tablet TAKE 1 TABLET BY MOUTH TWICE A DAY 60 tablet 2    hydroCHLOROthiazide (HYDRODIURIL) 25 MG tablet TAKE 1 TABLET (25 MG TOTAL) BY MOUTH ONCE DAILY. 90 tablet 2    methocarbamoL (ROBAXIN) 500 MG Tab Take by mouth.      methylPREDNISolone (MEDROL DOSEPACK) 4 mg tablet Take 4 mg by mouth. use as directed      sulfamethoxazole-trimethoprim 800-160mg  "(BACTRIM DS) 800-160 mg Tab Take 1 tablet by mouth 2 (two) times daily. for 7 days 14 tablet 0    traMADol (ULTRAM) 50 mg tablet TAKE 1 TABLET BY MOUTH EVERY 6 HOURS AS NEEDED FOR PAIN FOR UP TO 5 DAYS  0     No current facility-administered medications on file prior to visit.       REVIEW OF SYSTEMS:  Review of Systems Complete; Negative, unless noted above.    GENERAL PHYSICAL EXAM:   Ht 5' 9" (1.753 m)   Wt 111.6 kg (246 lb 0.5 oz)   BMI 36.33 kg/m²    GEN: well developed, well nourished, no acute distress   PULM: No wheezing, no respiratory distress   CV: RRR    ORTHO EXAM:   Examination of the left middle finger reveals a mild decrease in uniform swelling when compared to the previous physical examination.  No tenderness palpation of the flexor tendon sheath at the level the A1 pulley of the left middle finger.  Palpation of the left middle finger flexor tendon sheath at the proximal, middle, and distal phalanxes reveals much less fullness when compared to the previous physical examinations.  There has been a mild decrease in tenderness to palpation of the flexor tendon sheath at all levels, but more particularly at the levels of the distal phalanx and proximal phalanx.  Range of motion not tested secondary to pain.  Strength not tested secondary to pain.  Sensation is grossly intact of the left little finger.  Capillary refill less than 2 seconds.    RADIOLOGY:   None.    ASSESSMENT:   Left middle finger flexor tenosynovitis    PLAN:  1. I discussed with Makenzie Pizarro that considering she has improved since her last visit, we will continue to closely monitor her left middle finger.  We did discuss in great detail the importance of taking her antibiotics as instructed, and failure to do so would likely result in the need for surgical intervention or even hospitalization. We did discuss that the likely delay in improvement of her symptoms is due to her not taking her antibiotics immediately after discharge " from the hospital. We did discuss we would have her follow up in clinic in 4 days for repeat evaluation.  We did discuss for any worsening of her symptoms to contact the clinic immediately or report to the [place] [facility].  We discussed for any systemic symptoms such as fever, fatigue, or malaise to report the [place] [facility].  She verbally agreed with the treatment plan.  She verbalized understanding and verbally agreed with the treatment plan.    2. She was written a note for work stating she is unable to use the left hand/arm until seen again in clinic.      3. I would like her to follow up in clinic in 4 days for repeat evaluation.  She was instructed to contact clinic for any problems or concerns in the interim.

## 2023-01-31 ENCOUNTER — TELEPHONE (OUTPATIENT)
Dept: RHEUMATOLOGY | Facility: CLINIC | Age: 63
End: 2023-01-31
Payer: COMMERCIAL

## 2023-01-31 NOTE — TELEPHONE ENCOUNTER
----- Message from Rain Lock sent at 1/30/2023  4:37 PM CST -----  Regarding: RETURNING MISSED CALL  Contact: Patient  Type: Patient Call Back         Who called: Patient         What is the request in detail: Marcelino Juan I have Makenzie Pizarro returning your missed call regarding sched an appt with Rheum from her referral           Best call back number: [phone]         Additional Information:            Thank You

## 2023-02-03 ENCOUNTER — OFFICE VISIT (OUTPATIENT)
Dept: ORTHOPEDICS | Facility: CLINIC | Age: 63
End: 2023-02-03
Payer: COMMERCIAL

## 2023-02-03 DIAGNOSIS — M65.9 FLEXOR TENOSYNOVITIS OF FINGER: Primary | ICD-10-CM

## 2023-02-03 PROCEDURE — 1160F PR REVIEW ALL MEDS BY PRESCRIBER/CLIN PHARMACIST DOCUMENTED: ICD-10-PCS | Mod: CPTII,S$GLB,, | Performed by: PHYSICIAN ASSISTANT

## 2023-02-03 PROCEDURE — 99214 OFFICE O/P EST MOD 30 MIN: CPT | Mod: S$GLB,,, | Performed by: PHYSICIAN ASSISTANT

## 2023-02-03 PROCEDURE — 99214 PR OFFICE/OUTPT VISIT, EST, LEVL IV, 30-39 MIN: ICD-10-PCS | Mod: S$GLB,,, | Performed by: PHYSICIAN ASSISTANT

## 2023-02-03 PROCEDURE — 1159F MED LIST DOCD IN RCRD: CPT | Mod: CPTII,S$GLB,, | Performed by: PHYSICIAN ASSISTANT

## 2023-02-03 PROCEDURE — 1159F PR MEDICATION LIST DOCUMENTED IN MEDICAL RECORD: ICD-10-PCS | Mod: CPTII,S$GLB,, | Performed by: PHYSICIAN ASSISTANT

## 2023-02-03 PROCEDURE — 99999 PR PBB SHADOW E&M-EST. PATIENT-LVL III: ICD-10-PCS | Mod: PBBFAC,,, | Performed by: PHYSICIAN ASSISTANT

## 2023-02-03 PROCEDURE — 1160F RVW MEDS BY RX/DR IN RCRD: CPT | Mod: CPTII,S$GLB,, | Performed by: PHYSICIAN ASSISTANT

## 2023-02-03 PROCEDURE — 4010F PR ACE/ARB THEARPY RXD/TAKEN: ICD-10-PCS | Mod: CPTII,S$GLB,, | Performed by: PHYSICIAN ASSISTANT

## 2023-02-03 PROCEDURE — 99999 PR PBB SHADOW E&M-EST. PATIENT-LVL III: CPT | Mod: PBBFAC,,, | Performed by: PHYSICIAN ASSISTANT

## 2023-02-03 PROCEDURE — 4010F ACE/ARB THERAPY RXD/TAKEN: CPT | Mod: CPTII,S$GLB,, | Performed by: PHYSICIAN ASSISTANT

## 2023-02-03 RX ORDER — SULFAMETHOXAZOLE AND TRIMETHOPRIM 800; 160 MG/1; MG/1
1 TABLET ORAL 2 TIMES DAILY
Qty: 14 TABLET | Refills: 0 | Status: SHIPPED | OUTPATIENT
Start: 2023-02-03 | End: 2023-02-10

## 2023-02-03 RX ORDER — MUPIROCIN 20 MG/G
OINTMENT TOPICAL
Status: CANCELLED | OUTPATIENT
Start: 2023-02-03

## 2023-02-03 NOTE — PROGRESS NOTES
2/3/2023    HPI:  Makenzie Pizarro is a 62 y.o. female, who presents to clinic today for continued evaluation of her left middle finger flexor tenosynovitis.  States she has tendon tender to take her antibiotics as instructed.  States she believes her left little finger has not significantly changed since her last visit.  Denies any worsening.  Denies any improvement.  Denies any other complaints at this time.    PMHX:  Past Medical History:   Diagnosis Date    Endometriosis     Hypertension     Obesity, Class II, BMI 35-39.9, with comorbidity     Varicose veins        PSHX:  Past Surgical History:   Procedure Laterality Date    BILATERAL SALPINGOOPHORECTOMY      BREAST LUMPECTOMY      benign    HYSTERECTOMY         FMHX:  Family History   Problem Relation Age of Onset    Hypertension Mother     Diabetes Mother     Hypertension Father     Diabetes Father     Lung cancer Paternal Grandfather     Leukemia Paternal Uncle     Lung cancer Maternal Uncle        SOCHX:  Social History     Tobacco Use    Smoking status: Every Day     Packs/day: 0.50     Years: 35.00     Pack years: 17.50     Types: Cigarettes    Smokeless tobacco: Not on file   Substance Use Topics    Alcohol use: Yes     Alcohol/week: 11.7 standard drinks     Types: 14 drink(s) per week       ALLERGIES:  Naproxen    CURRENT MEDICATIONS:  Current Outpatient Medications on File Prior to Visit   Medication Sig Dispense Refill    amlodipine-benazepril 5-20 mg (LOTREL) 5-20 mg per capsule TAKE 1 CAPSULE BY MOUTH EVERY DAY 30 capsule 0    diclofenac (VOLTAREN) 75 MG EC tablet TAKE 1 TABLET BY MOUTH TWICE A DAY 60 tablet 2    methocarbamoL (ROBAXIN) 500 MG Tab Take by mouth.      methylPREDNISolone (MEDROL DOSEPACK) 4 mg tablet Take 4 mg by mouth. use as directed      traMADol (ULTRAM) 50 mg tablet TAKE 1 TABLET BY MOUTH EVERY 6 HOURS AS NEEDED FOR PAIN FOR UP TO 5 DAYS  0    [DISCONTINUED] sulfamethoxazole-trimethoprim 800-160mg (BACTRIM DS) 800-160 mg Tab Take  1 tablet by mouth 2 (two) times daily. for 7 days 14 tablet 0    hydroCHLOROthiazide (HYDRODIURIL) 25 MG tablet TAKE 1 TABLET (25 MG TOTAL) BY MOUTH ONCE DAILY. 90 tablet 2     No current facility-administered medications on file prior to visit.       REVIEW OF SYSTEMS:  Review of Systems Complete; Negative, unless noted above.    GENERAL PHYSICAL EXAM:   There were no vitals taken for this visit.   GEN: well developed, well nourished, no acute distress   PULM: No wheezing, no respiratory distress   CV: RRR    ORTHO EXAM:   Examination of the left hand reveals moderate edema of the left middle finger, with no significant change when compared to previous examination.  No tenderness palpation of the flexor tendon sheath at the level the A1 pulley of the left middle finger.  Tenderness to palpation of the proximal and middle phalanxes that is similar when compared to previous examination.  Range of motion not tested secondary to pain.  Strength not tested secondary to pain.  Sensation is grossly intact of the left middle finger.  Capillary refill less than 2 seconds.    RADIOLOGY:   None.    ASSESSMENT:   Left middle finger flexor tenosynovitis    PLAN:  1. I discussed with Makenzie Pizarro that considering she has not improved since her last visit, the best course of action this time would proceed with surgical intervention via a left middle finger flexor tendon sheath irrigation and debridement.  We did discuss for any worsening of her symptoms to report to the [place] [facility] or [place] [facility].  We discussed for any systemic symptoms such as fever, fatigue, released to report to the nearest emergency department.  She was instructed to continue taking her Bactrim until her surgery.  She verbalized understanding and verbally agreed with the treatment plan.      2. We discussed the risks and benefits of performing surgery, as well as not performing surgery.  All questions  were addressed and answered in clinic today.  Surgical consents will be signed the morning of surgery.    3.  We will proceed with a left middle finger flexor tendon sheath irrigation and debridement under general anesthesia.    4. I would like her follow up in clinic 3 days after surgery with Dr. Carrero.  She was instructed to contact clinic for any problems or concerns in the interim.

## 2023-02-03 NOTE — H&P (VIEW-ONLY)
2/3/2023    HPI:  Makenzie Pizarro is a 62 y.o. female, who presents to clinic today for continued evaluation of her left middle finger flexor tenosynovitis.  States she has tendon tender to take her antibiotics as instructed.  States she believes her left little finger has not significantly changed since her last visit.  Denies any worsening.  Denies any improvement.  Denies any other complaints at this time.    PMHX:  Past Medical History:   Diagnosis Date    Endometriosis     Hypertension     Obesity, Class II, BMI 35-39.9, with comorbidity     Varicose veins        PSHX:  Past Surgical History:   Procedure Laterality Date    BILATERAL SALPINGOOPHORECTOMY      BREAST LUMPECTOMY      benign    HYSTERECTOMY         FMHX:  Family History   Problem Relation Age of Onset    Hypertension Mother     Diabetes Mother     Hypertension Father     Diabetes Father     Lung cancer Paternal Grandfather     Leukemia Paternal Uncle     Lung cancer Maternal Uncle        SOCHX:  Social History     Tobacco Use    Smoking status: Every Day     Packs/day: 0.50     Years: 35.00     Pack years: 17.50     Types: Cigarettes    Smokeless tobacco: Not on file   Substance Use Topics    Alcohol use: Yes     Alcohol/week: 11.7 standard drinks     Types: 14 drink(s) per week       ALLERGIES:  Naproxen    CURRENT MEDICATIONS:  Current Outpatient Medications on File Prior to Visit   Medication Sig Dispense Refill    amlodipine-benazepril 5-20 mg (LOTREL) 5-20 mg per capsule TAKE 1 CAPSULE BY MOUTH EVERY DAY 30 capsule 0    diclofenac (VOLTAREN) 75 MG EC tablet TAKE 1 TABLET BY MOUTH TWICE A DAY 60 tablet 2    methocarbamoL (ROBAXIN) 500 MG Tab Take by mouth.      methylPREDNISolone (MEDROL DOSEPACK) 4 mg tablet Take 4 mg by mouth. use as directed      traMADol (ULTRAM) 50 mg tablet TAKE 1 TABLET BY MOUTH EVERY 6 HOURS AS NEEDED FOR PAIN FOR UP TO 5 DAYS  0    [DISCONTINUED] sulfamethoxazole-trimethoprim 800-160mg (BACTRIM DS) 800-160 mg Tab Take  1 tablet by mouth 2 (two) times daily. for 7 days 14 tablet 0    hydroCHLOROthiazide (HYDRODIURIL) 25 MG tablet TAKE 1 TABLET (25 MG TOTAL) BY MOUTH ONCE DAILY. 90 tablet 2     No current facility-administered medications on file prior to visit.       REVIEW OF SYSTEMS:  Review of Systems Complete; Negative, unless noted above.    GENERAL PHYSICAL EXAM:   There were no vitals taken for this visit.   GEN: well developed, well nourished, no acute distress   PULM: No wheezing, no respiratory distress   CV: RRR    ORTHO EXAM:   Examination of the left hand reveals moderate edema of the left middle finger, with no significant change when compared to previous examination.  No tenderness palpation of the flexor tendon sheath at the level the A1 pulley of the left middle finger.  Tenderness to palpation of the proximal and middle phalanxes that is similar when compared to previous examination.  Range of motion not tested secondary to pain.  Strength not tested secondary to pain.  Sensation is grossly intact of the left middle finger.  Capillary refill less than 2 seconds.    RADIOLOGY:   None.    ASSESSMENT:   Left middle finger flexor tenosynovitis    PLAN:  1. I discussed with Makenzie Pizarro that considering she has not improved since her last visit, the best course of action this time would proceed with surgical intervention via a left middle finger flexor tendon sheath irrigation and debridement.  We did discuss for any worsening of her symptoms to report to the [facility] or [facility].  We discussed for any systemic symptoms such as fever, fatigue, released to report to the nearest emergency department.  She was instructed to continue taking her Bactrim until her surgery.  She verbalized understanding and verbally agreed with the treatment plan.      2. We discussed the risks and benefits of performing surgery, as well as not performing surgery.  All questions  were addressed and answered in clinic today.  Surgical consents will be signed the morning of surgery.    3.  We will proceed with a left middle finger flexor tendon sheath irrigation and debridement under general anesthesia.    4. I would like her follow up in clinic 3 days after surgery with Dr. Carrero.  She was instructed to contact clinic for any problems or concerns in the interim.

## 2023-02-06 ENCOUNTER — ANESTHESIA EVENT (OUTPATIENT)
Dept: SURGERY | Facility: HOSPITAL | Age: 63
End: 2023-02-06
Payer: COMMERCIAL

## 2023-02-07 ENCOUNTER — ANESTHESIA (OUTPATIENT)
Dept: SURGERY | Facility: HOSPITAL | Age: 63
End: 2023-02-07
Payer: COMMERCIAL

## 2023-02-07 ENCOUNTER — HOSPITAL ENCOUNTER (OUTPATIENT)
Facility: HOSPITAL | Age: 63
Discharge: HOME | End: 2023-02-07
Attending: ORTHOPAEDIC SURGERY | Admitting: ORTHOPAEDIC SURGERY
Payer: COMMERCIAL

## 2023-02-07 VITALS
RESPIRATION RATE: 14 BRPM | DIASTOLIC BLOOD PRESSURE: 100 MMHG | HEART RATE: 85 BPM | HEIGHT: 69 IN | SYSTOLIC BLOOD PRESSURE: 189 MMHG | BODY MASS INDEX: 36.43 KG/M2 | WEIGHT: 246 LBS | TEMPERATURE: 98 F | OXYGEN SATURATION: 94 %

## 2023-02-07 DIAGNOSIS — M65.9 FLEXOR TENOSYNOVITIS OF FINGER: ICD-10-CM

## 2023-02-07 LAB
GRAM STN SPEC: NORMAL

## 2023-02-07 PROCEDURE — 87075 CULTR BACTERIA EXCEPT BLOOD: CPT | Performed by: ORTHOPAEDIC SURGERY

## 2023-02-07 PROCEDURE — 87205 SMEAR GRAM STAIN: CPT | Mod: 59 | Performed by: ORTHOPAEDIC SURGERY

## 2023-02-07 PROCEDURE — 26080 PR EXPLORE/TREAT INTERPHALANGEAL JT,EA: ICD-10-PCS | Mod: 51,F2,, | Performed by: ORTHOPAEDIC SURGERY

## 2023-02-07 PROCEDURE — 37000009 HC ANESTHESIA EA ADD 15 MINS: Mod: PO | Performed by: ORTHOPAEDIC SURGERY

## 2023-02-07 PROCEDURE — 63600175 PHARM REV CODE 636 W HCPCS: Mod: PO | Performed by: ANESTHESIOLOGY

## 2023-02-07 PROCEDURE — 71000015 HC POSTOP RECOV 1ST HR: Mod: PO | Performed by: ORTHOPAEDIC SURGERY

## 2023-02-07 PROCEDURE — 26020 PR DRAIN HAND TENDON SHEATH: ICD-10-PCS | Mod: F2,,, | Performed by: ORTHOPAEDIC SURGERY

## 2023-02-07 PROCEDURE — 25000003 PHARM REV CODE 250: Mod: PO | Performed by: ANESTHESIOLOGY

## 2023-02-07 PROCEDURE — 37000008 HC ANESTHESIA 1ST 15 MINUTES: Mod: PO | Performed by: ORTHOPAEDIC SURGERY

## 2023-02-07 PROCEDURE — 25000003 PHARM REV CODE 250: Mod: PO | Performed by: ORTHOPAEDIC SURGERY

## 2023-02-07 PROCEDURE — 26080 EXPLORE/TREAT FINGER JOINT: CPT | Mod: 51,F2,, | Performed by: ORTHOPAEDIC SURGERY

## 2023-02-07 PROCEDURE — 63600175 PHARM REV CODE 636 W HCPCS: Mod: PO | Performed by: NURSE ANESTHETIST, CERTIFIED REGISTERED

## 2023-02-07 PROCEDURE — D9220A PRA ANESTHESIA: ICD-10-PCS | Mod: CRNA,,, | Performed by: NURSE ANESTHETIST, CERTIFIED REGISTERED

## 2023-02-07 PROCEDURE — 71000016 HC POSTOP RECOV ADDL HR: Mod: PO | Performed by: ORTHOPAEDIC SURGERY

## 2023-02-07 PROCEDURE — 25000003 PHARM REV CODE 250: Mod: PO | Performed by: PHYSICIAN ASSISTANT

## 2023-02-07 PROCEDURE — 87116 MYCOBACTERIA CULTURE: CPT | Mod: 59 | Performed by: ORTHOPAEDIC SURGERY

## 2023-02-07 PROCEDURE — 87070 CULTURE OTHR SPECIMN AEROBIC: CPT | Mod: 59 | Performed by: ORTHOPAEDIC SURGERY

## 2023-02-07 PROCEDURE — 27200651 HC AIRWAY, LMA: Mod: PO | Performed by: ANESTHESIOLOGY

## 2023-02-07 PROCEDURE — 25000003 PHARM REV CODE 250: Mod: PO | Performed by: NURSE ANESTHETIST, CERTIFIED REGISTERED

## 2023-02-07 PROCEDURE — D9220A PRA ANESTHESIA: ICD-10-PCS | Mod: ANES,,, | Performed by: ANESTHESIOLOGY

## 2023-02-07 PROCEDURE — D9220A PRA ANESTHESIA: Mod: ANES,,, | Performed by: ANESTHESIOLOGY

## 2023-02-07 PROCEDURE — 87206 SMEAR FLUORESCENT/ACID STAI: CPT | Mod: 91 | Performed by: ORTHOPAEDIC SURGERY

## 2023-02-07 PROCEDURE — 26020 DRAIN HAND TENDON SHEATH: CPT | Mod: F2,,, | Performed by: ORTHOPAEDIC SURGERY

## 2023-02-07 PROCEDURE — 87102 FUNGUS ISOLATION CULTURE: CPT | Mod: 59 | Performed by: ORTHOPAEDIC SURGERY

## 2023-02-07 PROCEDURE — 36000707: Mod: PO | Performed by: ORTHOPAEDIC SURGERY

## 2023-02-07 PROCEDURE — 36000706: Mod: PO | Performed by: ORTHOPAEDIC SURGERY

## 2023-02-07 PROCEDURE — D9220A PRA ANESTHESIA: Mod: CRNA,,, | Performed by: NURSE ANESTHETIST, CERTIFIED REGISTERED

## 2023-02-07 RX ORDER — OXYCODONE HYDROCHLORIDE 5 MG/1
5 TABLET ORAL
Status: DISCONTINUED | OUTPATIENT
Start: 2023-02-07 | End: 2023-02-07 | Stop reason: HOSPADM

## 2023-02-07 RX ORDER — HYDROMORPHONE HYDROCHLORIDE 2 MG/ML
0.2 INJECTION, SOLUTION INTRAMUSCULAR; INTRAVENOUS; SUBCUTANEOUS EVERY 5 MIN PRN
Status: DISCONTINUED | OUTPATIENT
Start: 2023-02-07 | End: 2023-02-07 | Stop reason: HOSPADM

## 2023-02-07 RX ORDER — PROPOFOL 10 MG/ML
VIAL (ML) INTRAVENOUS
Status: DISCONTINUED | OUTPATIENT
Start: 2023-02-07 | End: 2023-02-07

## 2023-02-07 RX ORDER — LABETALOL HYDROCHLORIDE 5 MG/ML
10 INJECTION, SOLUTION INTRAVENOUS ONCE
Status: COMPLETED | OUTPATIENT
Start: 2023-02-07 | End: 2023-02-07

## 2023-02-07 RX ORDER — LIDOCAINE HYDROCHLORIDE 10 MG/ML
INJECTION INFILTRATION; PERINEURAL
Status: DISCONTINUED | OUTPATIENT
Start: 2023-02-07 | End: 2023-02-07 | Stop reason: HOSPADM

## 2023-02-07 RX ORDER — HYDRALAZINE HYDROCHLORIDE 20 MG/ML
10 INJECTION INTRAMUSCULAR; INTRAVENOUS ONCE
Status: COMPLETED | OUTPATIENT
Start: 2023-02-07 | End: 2023-02-07

## 2023-02-07 RX ORDER — MEPERIDINE HYDROCHLORIDE 50 MG/ML
12.5 INJECTION INTRAMUSCULAR; INTRAVENOUS; SUBCUTANEOUS ONCE
Status: DISCONTINUED | OUTPATIENT
Start: 2023-02-07 | End: 2023-02-07 | Stop reason: HOSPADM

## 2023-02-07 RX ORDER — ONDANSETRON 8 MG/1
8 TABLET, ORALLY DISINTEGRATING ORAL EVERY 8 HOURS PRN
Qty: 3 TABLET | Refills: 0 | Status: SHIPPED | OUTPATIENT
Start: 2023-02-07

## 2023-02-07 RX ORDER — BUPIVACAINE HYDROCHLORIDE 5 MG/ML
INJECTION, SOLUTION EPIDURAL; INTRACAUDAL
Status: DISCONTINUED | OUTPATIENT
Start: 2023-02-07 | End: 2023-02-07 | Stop reason: HOSPADM

## 2023-02-07 RX ORDER — DIPHENHYDRAMINE HYDROCHLORIDE 50 MG/ML
12.5 INJECTION INTRAMUSCULAR; INTRAVENOUS EVERY 6 HOURS PRN
Status: DISCONTINUED | OUTPATIENT
Start: 2023-02-07 | End: 2023-02-07 | Stop reason: HOSPADM

## 2023-02-07 RX ORDER — ACETAMINOPHEN 10 MG/ML
INJECTION, SOLUTION INTRAVENOUS
Status: DISCONTINUED | OUTPATIENT
Start: 2023-02-07 | End: 2023-02-07

## 2023-02-07 RX ORDER — LIDOCAINE HYDROCHLORIDE 10 MG/ML
1 INJECTION, SOLUTION EPIDURAL; INFILTRATION; INTRACAUDAL; PERINEURAL ONCE
Status: COMPLETED | OUTPATIENT
Start: 2023-02-07 | End: 2023-02-07

## 2023-02-07 RX ORDER — SODIUM CHLORIDE, SODIUM LACTATE, POTASSIUM CHLORIDE, CALCIUM CHLORIDE 600; 310; 30; 20 MG/100ML; MG/100ML; MG/100ML; MG/100ML
INJECTION, SOLUTION INTRAVENOUS CONTINUOUS
Status: DISCONTINUED | OUTPATIENT
Start: 2023-02-07 | End: 2023-02-07 | Stop reason: HOSPADM

## 2023-02-07 RX ORDER — DEXAMETHASONE SODIUM PHOSPHATE 4 MG/ML
INJECTION, SOLUTION INTRA-ARTICULAR; INTRALESIONAL; INTRAMUSCULAR; INTRAVENOUS; SOFT TISSUE
Status: DISCONTINUED | OUTPATIENT
Start: 2023-02-07 | End: 2023-02-07

## 2023-02-07 RX ORDER — FENTANYL CITRATE 50 UG/ML
25 INJECTION, SOLUTION INTRAMUSCULAR; INTRAVENOUS EVERY 5 MIN PRN
Status: DISCONTINUED | OUTPATIENT
Start: 2023-02-07 | End: 2023-02-07 | Stop reason: HOSPADM

## 2023-02-07 RX ORDER — FENTANYL CITRATE 50 UG/ML
INJECTION, SOLUTION INTRAMUSCULAR; INTRAVENOUS
Status: DISCONTINUED | OUTPATIENT
Start: 2023-02-07 | End: 2023-02-07

## 2023-02-07 RX ORDER — CEFAZOLIN SODIUM 1 G/3ML
INJECTION, POWDER, FOR SOLUTION INTRAMUSCULAR; INTRAVENOUS
Status: DISCONTINUED | OUTPATIENT
Start: 2023-02-07 | End: 2023-02-07

## 2023-02-07 RX ORDER — ONDANSETRON 2 MG/ML
INJECTION INTRAMUSCULAR; INTRAVENOUS
Status: DISCONTINUED | OUTPATIENT
Start: 2023-02-07 | End: 2023-02-07

## 2023-02-07 RX ORDER — MIDAZOLAM HYDROCHLORIDE 1 MG/ML
INJECTION INTRAMUSCULAR; INTRAVENOUS
Status: DISCONTINUED | OUTPATIENT
Start: 2023-02-07 | End: 2023-02-07

## 2023-02-07 RX ORDER — MUPIROCIN 20 MG/G
OINTMENT TOPICAL
Status: DISCONTINUED | OUTPATIENT
Start: 2023-02-07 | End: 2023-02-07 | Stop reason: HOSPADM

## 2023-02-07 RX ORDER — OXYCODONE HYDROCHLORIDE 5 MG/1
5 TABLET ORAL EVERY 4 HOURS PRN
Qty: 8 TABLET | Refills: 0 | Status: SHIPPED | OUTPATIENT
Start: 2023-02-07 | End: 2023-02-15

## 2023-02-07 RX ORDER — LIDOCAINE HCL/PF 100 MG/5ML
SYRINGE (ML) INTRAVENOUS
Status: DISCONTINUED | OUTPATIENT
Start: 2023-02-07 | End: 2023-02-07

## 2023-02-07 RX ADMIN — ACETAMINOPHEN 1000 MG: 10 INJECTION, SOLUTION INTRAVENOUS at 12:02

## 2023-02-07 RX ADMIN — MIDAZOLAM HYDROCHLORIDE 2 MG: 1 INJECTION, SOLUTION INTRAMUSCULAR; INTRAVENOUS at 12:02

## 2023-02-07 RX ADMIN — SODIUM CHLORIDE, POTASSIUM CHLORIDE, SODIUM LACTATE AND CALCIUM CHLORIDE: 600; 310; 30; 20 INJECTION, SOLUTION INTRAVENOUS at 11:02

## 2023-02-07 RX ADMIN — LIDOCAINE HYDROCHLORIDE 100 MG: 20 INJECTION, SOLUTION INTRAVENOUS at 12:02

## 2023-02-07 RX ADMIN — MUPIROCIN: 20 OINTMENT TOPICAL at 11:02

## 2023-02-07 RX ADMIN — PROPOFOL 150 MG: 10 INJECTION, EMULSION INTRAVENOUS at 12:02

## 2023-02-07 RX ADMIN — LABETALOL HYDROCHLORIDE 10 MG: 5 INJECTION, SOLUTION INTRAVENOUS at 01:02

## 2023-02-07 RX ADMIN — HYDRALAZINE HYDROCHLORIDE 10 MG: 20 INJECTION INTRAMUSCULAR; INTRAVENOUS at 02:02

## 2023-02-07 RX ADMIN — FENTANYL CITRATE 50 MCG: 50 INJECTION, SOLUTION INTRAMUSCULAR; INTRAVENOUS at 12:02

## 2023-02-07 RX ADMIN — DEXAMETHASONE SODIUM PHOSPHATE 4 MG: 4 INJECTION, SOLUTION INTRAMUSCULAR; INTRAVENOUS at 12:02

## 2023-02-07 RX ADMIN — ONDANSETRON 4 MG: 2 INJECTION, SOLUTION INTRAMUSCULAR; INTRAVENOUS at 12:02

## 2023-02-07 RX ADMIN — LIDOCAINE HYDROCHLORIDE 10 MG: 10 INJECTION, SOLUTION EPIDURAL; INFILTRATION; INTRACAUDAL; PERINEURAL at 11:02

## 2023-02-07 RX ADMIN — CEFAZOLIN 2 G: 330 INJECTION, POWDER, FOR SOLUTION INTRAMUSCULAR; INTRAVENOUS at 12:02

## 2023-02-07 NOTE — ANESTHESIA PREPROCEDURE EVALUATION
02/07/2023  Makenzie Pizarro is a 62 y.o., female.      Pre-op Assessment    I have reviewed the Patient Summary Reports.     I have reviewed the Nursing Notes. I have reviewed the NPO Status.   I have reviewed the Medications.     Review of Systems  Anesthesia Hx:  No problems with previous Anesthesia    Social:  Smoker, Alcohol Use    Hematology/Oncology:  Hematology Normal   Oncology Normal     EENT/Dental:EENT/Dental Normal   Cardiovascular:   Hypertension    Pulmonary:  Pulmonary Normal    Musculoskeletal:   Flexor tenosynovitis of finger   Neurological:   Neuromuscular Disease,    Endocrine:  Obesity / BMI > 30  Dermatological:  Skin Normal    Psych:  Psychiatric Normal           Physical Exam  General: Cooperative, Alert and Oriented    Airway:  Mallampati: II   Mouth Opening: Normal  TM Distance: Normal  Neck ROM: Normal ROM    Dental:  Intact        Anesthesia Plan  Type of Anesthesia, risks & benefits discussed:    Anesthesia Type: Gen Supraglottic Airway  Intra-op Monitoring Plan: Standard ASA Monitors  Post Op Pain Control Plan: multimodal analgesia and IV/PO Opioids PRN  Induction:  IV  Airway Plan: Direct  Informed Consent: Informed consent signed with the Patient and all parties understand the risks and agree with anesthesia plan.  All questions answered.   ASA Score: 2  Day of Surgery Review of History & Physical: H&P Update referred to the surgeon/provider.    Ready For Surgery From Anesthesia Perspective.     .

## 2023-02-07 NOTE — OP NOTE
Makenzie Pizarro  1960    DATE OF SURGERY: 2/7/2023     PRE-OPERATIVE DIAGNOSIS:  Left middle finger flexor tenosynovitis with possible inflammatory arthritis    POST-OPERATIVE DIAGNOSIS:  Left middle finger flexor tenosynovitis with inflammatory arthritis     ANESTHESIA TYPE:  General    BLOOD LOSS:  Less than 10 cc    TOURNIQUET TIME:  Approximately 15 minutes    SURGEON: Dr Carrero    ASSISTANT:  Zahra Cook    PROCEDURE:   1.  Irrigation and debridement of left middle finger flexor tendon sheath  2.  Left middle finger proximal interphalangeal joint arthrotomy    IMPLANTS:  None     SPECIMENS:  Multiple wound cultures were taken from different sites.    INDICATION:     Ms. Pizarro had a history of swelling redness and pain to the left middle finger.  This persisted for several weeks.  She failed oral antibiotics as well as oral steroid.  At this point there was concern over possibility of infectious flexor tenosynovitis versus septic arthritis.  At that point I did have a discussion about the risks and benefits of flexor tendon sheath exploration with culture and biopsy as necessary.  Informed consent was then obtained    PROCEDURE IN DETAIL:     Ms. Pizarro was transported to the operating room and was placed supine on the operating room table. All appropriate points were padded. The left arm and hand was prepped and draped in the normal sterile fashion. Time out was called. The correct patient, correct operative site, correct procedure, antibiotic administration which consisted of 2 g of Ancef were given after cultures were obtained, and allergies to medications which are to Naproxen  were reviewed. Time in was then called.     Attention was turned to the left middle finger.  A Brunner type incision was made over the palmar surface.  This was centered over the proximal interphalangeal joint.  The incision was carried through the skin.  Subcutaneous tissues were dissected with tenotomy scissors.  The  tendon sheath was identified.  The tendon sheath was then incised over the cruciate pulley.  There was noted to be serous fluid within the tendon sheath.  There was no infectious nature to that fluid however culture swab was used to culture the fluid.  The tendon sheath was explored proximally and distally and no further pathology was noted.  The tendons were intact without significant pathology.  There was a fullness noted to the proximal interphalangeal joint.  A longitudinal incision was made through the proximal interphalangeal joint capsule the proximal interphalangeal joint was open.  There was noted to be a small to moderate amount of clear serous type fluid within the joint.  There was no crystalline nature of the appearance of the fluid.  The fluid did not appear to be infectious in nature.  The fluid was aspirated and was placed onto a culture swab and sent for culture.  A 2nd culture swab was placed within the articular space and the joint was again cultured.  At this point the joint was visualized.  The articular surfaces were noted to be intact.  The joint was then copiously irrigated with approximately 100 cc of normal saline.  The tendon sheath was also irrigated with an additional 900 cc of normal saline.  The tourniquet at this point was let down and hemostasis was obtained.  The wound was closed with 4-0 nylon superficially.  The wound was dressed with Xeroform, gauze padding, Ralph wrap and Coban dressing.    The patient was awakened from anesthesia and was transported to the recovery room in stable condition. All lap, needle, sponge, and equipment counts were correct at the end of the case.    POST-OPERATIVE PLAN:     Patient will keep this wrap in place for 3-5 days at which time she will follow up with me.  We will review the results of the culture.  Will consider continuing antibiotics versus aggressive anti-inflammatory therapies based off the results of the culture.

## 2023-02-07 NOTE — DISCHARGE INSTRUCTIONS
AFTER HAND SURGERY    DO:  Keep affected extremity elevated above the level of your heart.  Check circulation frequently in fingers by pressing on the nail bed. Nail bed should turn white and then pink when released.  Exercise fingers to promote circulation.  Advance diet as tolerated.  Resume home medications.    Dequeverains Release/CTR  Keep splint dressing clean and dry for two weeks by covering it with 2 plastic bags secured with rubber bands above your dressing.    Carpal Tunnel/Trigger Finger Release  Remove dressing in three days.    DO NOT:  Do not drive for 24 hours or while taking narcotic pain medication.  Do not take additional tylenol/acetaminophen while taking narcotic pain medication that contains tylenol/acetaminophen.     CALL PHYSICIAN FOR:  Obvious bleeding.  Excessive swelling.  Drainage (pus) from the wound.  Pain unrelieved by pain medication.    Contact your physician for emergencies.  [phone]

## 2023-02-07 NOTE — ANESTHESIA PROCEDURE NOTES
Intubation    Date/Time: 2/7/2023 12:20 PM  Performed by: Ashley Lange CRNA  Authorized by: Chidi Butler MD     Intubation:     Induction:  Intravenous    Intubated:  Postinduction    Mask Ventilation:  N/a    Attempts:  1    Attempted By:  CRNA    Difficult Airway Encountered?: No      Complications:  None    Airway Device:  Supraglottic airway/LMA    Airway Device Size:  4.0    Style/Cuff Inflation:  Cuffed (inflated to minimal occlusive pressure)    Placement Verified By:  Capnometry    Findings Post-Intubation:  Atraumatic/condition of teeth unchanged

## 2023-02-07 NOTE — PLAN OF CARE
Plan of care discussed with patient. Procedure education provided. All questions and concerns answered. Patient verbalizes understanding.     1130 Dr. Butler, Anesthesia, notified patient's BP elevated, 197/98. Patient has history of HTN and held her morning Lotrel and Hydrodiuril. No orders at this time, MD plans to closely monitor and treat as needed.

## 2023-02-07 NOTE — PATIENT INSTRUCTIONS
Procedure:  Left middle finger tendon sheath exploration with culture irrigation and debridement    1. Please keep the dressing clean, dry, and in place. Do not take it off and do not get it wet.    2. The dressing to the surgical hand will remain in place until you follow-up with me in 2 weeks.  Please keep it clean and dry.    3. Please keep the left arm and hand elevated for the 1st 24-48 hours to prevent swelling    4. Flexion and extension of the exposed fingers is encouraged, but do not attempt to push off or lift more than 1-2 pounds with left arm or hand    5. Please limit weightbearing to the left hand to 1-2 pounds. Light activity such as brushing your teeth, using a fork, or lifting a small drink are allowed starting today.    6. Pain medication has been prescribed. Please take it as necessary    7. If there are any questions or concerns please call Dr. Carrero's office.    8.  Follow up with Dr. Carrero on Friday 2/10/23

## 2023-02-07 NOTE — DISCHARGE SUMMARY
[facility] - Surgery  Discharge Note  Short Stay    Procedure(s) (LRB):  Left middle finger flexor tendon sheath irrigation and debridement (Left)      OUTCOME: Patient tolerated treatment/procedure well without complication and is now ready for discharge.    DISPOSITION: Home or Self Care    FINAL DIAGNOSIS:  Flexor tenosynovitis of finger    FOLLOWUP: In clinic    DISCHARGE INSTRUCTIONS:    Discharge Procedure Orders   Diet general     Activity as tolerated     Keep surgical extremity elevated     Lifting restrictions   Order Comments: Please limit lifting with the left arm and hand to 1-2 lb     Leave dressing on - Keep it clean, dry, and intact until clinic visit     Call MD for:  temperature >100.4     Call MD for:  persistent nausea and vomiting     Call MD for:  severe uncontrolled pain     Call MD for:  difficulty breathing, headache or visual disturbances     Call MD for:  redness, tenderness, or signs of infection (pain, swelling, redness, odor or green/yellow discharge around incision site)     Call MD for:  hives     Call MD for:  persistent dizziness or light-headedness     Call MD for:  extreme fatigue        TIME SPENT ON DISCHARGE: 15 minutes

## 2023-02-07 NOTE — TRANSFER OF CARE
"Anesthesia Transfer of Care Note    Patient: Makenzie Pizarro    Procedure(s) Performed: Procedure(s) (LRB):  Left middle finger flexor tendon sheath irrigation and debridement (Left)    Patient location: PACU    Anesthesia Type: general    Transport from OR: Transported from OR on 2-3 L/min O2 by NC with adequate spontaneous ventilation    Post pain: adequate analgesia    Post assessment: no apparent anesthetic complications and tolerated procedure well    Post vital signs: stable    Level of consciousness: sedated and awake    Nausea/Vomiting: no nausea/vomiting    Complications: none    Transfer of care protocol was followed      Last vitals:   Visit Vitals  BP (!) 231/136   Pulse 90   Temp 36.8 °C (98.2 °F)   Resp 17   Ht 5' 9" (1.753 m)   Wt 111.6 kg (246 lb)   SpO2 99%   Breastfeeding No   BMI 36.33 kg/m²     "

## 2023-02-07 NOTE — ANESTHESIA POSTPROCEDURE EVALUATION
Anesthesia Post Evaluation    Patient: Makenzie Pizarro    Procedure(s) Performed: Procedure(s) (LRB):  Left middle finger flexor tendon sheath irrigation and debridement (Left)    Final Anesthesia Type: general      Patient location during evaluation: PACU  Patient participation: Yes- Able to Participate  Level of consciousness: awake and alert  Post-procedure vital signs: reviewed and stable  Pain management: adequate  Airway patency: patent    PONV status at discharge: No PONV  Anesthetic complications: no      Cardiovascular status: hemodynamically stable  Respiratory status: unassisted and room air  Hydration status: euvolemic  Follow-up not needed.        VSS    No case tracking events are documented in the log.      Pain/Geronimo Score: Geronimo Score: 10 (2/7/2023  2:07 PM)

## 2023-02-09 ENCOUNTER — TELEPHONE (OUTPATIENT)
Dept: ORTHOPEDICS | Facility: CLINIC | Age: 63
End: 2023-02-09
Payer: COMMERCIAL

## 2023-02-09 NOTE — TELEPHONE ENCOUNTER
Returned call to pt, no answer. Left VMM informing per  she can take two of her pain medication for pain control and then go back to taking as prescribed, as refill can be called in if needed. Also wound check apt scheduled for 2/10 @ 10:40.

## 2023-02-10 ENCOUNTER — OFFICE VISIT (OUTPATIENT)
Dept: ORTHOPEDICS | Facility: CLINIC | Age: 63
End: 2023-02-10
Payer: COMMERCIAL

## 2023-02-10 DIAGNOSIS — M65.9 FLEXOR TENOSYNOVITIS OF FINGER: Primary | ICD-10-CM

## 2023-02-10 LAB
BACTERIA SPEC AEROBE CULT: NO GROWTH

## 2023-02-10 PROCEDURE — 99999 PR PBB SHADOW E&M-EST. PATIENT-LVL II: ICD-10-PCS | Mod: PBBFAC,,, | Performed by: ORTHOPAEDIC SURGERY

## 2023-02-10 PROCEDURE — 1159F PR MEDICATION LIST DOCUMENTED IN MEDICAL RECORD: ICD-10-PCS | Mod: CPTII,S$GLB,, | Performed by: ORTHOPAEDIC SURGERY

## 2023-02-10 PROCEDURE — 99024 PR POST-OP FOLLOW-UP VISIT: ICD-10-PCS | Mod: S$GLB,,, | Performed by: ORTHOPAEDIC SURGERY

## 2023-02-10 PROCEDURE — 4010F PR ACE/ARB THEARPY RXD/TAKEN: ICD-10-PCS | Mod: CPTII,S$GLB,, | Performed by: ORTHOPAEDIC SURGERY

## 2023-02-10 PROCEDURE — 99024 POSTOP FOLLOW-UP VISIT: CPT | Mod: S$GLB,,, | Performed by: ORTHOPAEDIC SURGERY

## 2023-02-10 PROCEDURE — 1159F MED LIST DOCD IN RCRD: CPT | Mod: CPTII,S$GLB,, | Performed by: ORTHOPAEDIC SURGERY

## 2023-02-10 PROCEDURE — 99999 PR PBB SHADOW E&M-EST. PATIENT-LVL II: CPT | Mod: PBBFAC,,, | Performed by: ORTHOPAEDIC SURGERY

## 2023-02-10 PROCEDURE — 4010F ACE/ARB THERAPY RXD/TAKEN: CPT | Mod: CPTII,S$GLB,, | Performed by: ORTHOPAEDIC SURGERY

## 2023-02-10 RX ORDER — OXYCODONE HYDROCHLORIDE 10 MG/1
10 TABLET ORAL EVERY 4 HOURS PRN
Qty: 14 TABLET | Refills: 0 | Status: SHIPPED | OUTPATIENT
Start: 2023-02-10 | End: 2023-02-15

## 2023-02-10 NOTE — PROGRESS NOTES
Ms Alva returns to clinic today.  She is status post left middle finger tendon sheath exploration with culture and biopsy.  She is overall doing well but still has a significant amount of pain.  She is here for a wound check.      Physical exam:  Examination left hand middle finger reveals that the incision is healing well.  There are sutures in place.  There is still moderate edema.  There is no significant erythema.  There is no drainage.  She is neurovascularly intact at the tip of the finger.      Wound cultures: All the wound cultures are no growth to date.      Assessment:  Left middle finger tenosynovitis most likely inflammatory     Plan:    1.  Her wounds were cleaned today and a new soft dressing was placed    2.  Will continue to monitor for any different results from the culture    3.  She will follow up in 10 days for repeat evaluation at which point sutures will be removed and we will consider beginning therapy.    4. Will refill her pain medication today

## 2023-02-13 LAB — BACTERIA SPEC ANAEROBE CULT: NORMAL

## 2023-02-14 ENCOUNTER — TELEPHONE (OUTPATIENT)
Dept: ORTHOPEDICS | Facility: CLINIC | Age: 63
End: 2023-02-14
Payer: COMMERCIAL

## 2023-02-14 LAB
BACTERIA SPEC ANAEROBE CULT: NORMAL
BACTERIA SPEC ANAEROBE CULT: NORMAL

## 2023-02-14 NOTE — TELEPHONE ENCOUNTER
----- Message from Rickey Lennon sent at 2/14/2023 12:39 PM CST -----  Regarding: can't get Rx,  needs to aprove , call pt   Contact: pt   can't get Rx,  needs to aprove , call pt

## 2023-02-14 NOTE — TELEPHONE ENCOUNTER
Called and spoke with patient regarding her medication.  I asked patient if she was indeed allergic to Naproxin and she confirmed yes.  She stated it gives her itching and rash.  I told her we will speak with Dr. Carrero and get back in touch with her

## 2023-02-15 ENCOUNTER — TELEPHONE (OUTPATIENT)
Dept: ORTHOPEDICS | Facility: CLINIC | Age: 63
End: 2023-02-15
Payer: COMMERCIAL

## 2023-02-15 DIAGNOSIS — M65.9 FLEXOR TENOSYNOVITIS OF FINGER: Primary | ICD-10-CM

## 2023-02-15 RX ORDER — OXYCODONE HYDROCHLORIDE 10 MG/1
10 TABLET ORAL EVERY 4 HOURS PRN
Qty: 14 TABLET | Refills: 0 | Status: SHIPPED | OUTPATIENT
Start: 2023-02-15

## 2023-02-15 NOTE — TELEPHONE ENCOUNTER
Called and stated to patient that Zach Usman sent medication over to the pharmacy.  Patient understood.

## 2023-02-20 ENCOUNTER — OFFICE VISIT (OUTPATIENT)
Dept: ORTHOPEDICS | Facility: CLINIC | Age: 63
End: 2023-02-20
Payer: COMMERCIAL

## 2023-02-20 VITALS — BODY MASS INDEX: 36.43 KG/M2 | WEIGHT: 246 LBS | HEIGHT: 69 IN

## 2023-02-20 DIAGNOSIS — M65.9 FLEXOR TENOSYNOVITIS OF FINGER: Primary | ICD-10-CM

## 2023-02-20 PROCEDURE — 3008F PR BODY MASS INDEX (BMI) DOCUMENTED: ICD-10-PCS | Mod: CPTII,S$GLB,, | Performed by: ORTHOPAEDIC SURGERY

## 2023-02-20 PROCEDURE — 99024 POSTOP FOLLOW-UP VISIT: CPT | Mod: S$GLB,,, | Performed by: ORTHOPAEDIC SURGERY

## 2023-02-20 PROCEDURE — 1159F MED LIST DOCD IN RCRD: CPT | Mod: CPTII,S$GLB,, | Performed by: ORTHOPAEDIC SURGERY

## 2023-02-20 PROCEDURE — 4010F PR ACE/ARB THEARPY RXD/TAKEN: ICD-10-PCS | Mod: CPTII,S$GLB,, | Performed by: ORTHOPAEDIC SURGERY

## 2023-02-20 PROCEDURE — 99999 PR PBB SHADOW E&M-EST. PATIENT-LVL III: CPT | Mod: PBBFAC,,, | Performed by: ORTHOPAEDIC SURGERY

## 2023-02-20 PROCEDURE — 1159F PR MEDICATION LIST DOCUMENTED IN MEDICAL RECORD: ICD-10-PCS | Mod: CPTII,S$GLB,, | Performed by: ORTHOPAEDIC SURGERY

## 2023-02-20 PROCEDURE — 3008F BODY MASS INDEX DOCD: CPT | Mod: CPTII,S$GLB,, | Performed by: ORTHOPAEDIC SURGERY

## 2023-02-20 PROCEDURE — 99999 PR PBB SHADOW E&M-EST. PATIENT-LVL III: ICD-10-PCS | Mod: PBBFAC,,, | Performed by: ORTHOPAEDIC SURGERY

## 2023-02-20 PROCEDURE — 4010F ACE/ARB THERAPY RXD/TAKEN: CPT | Mod: CPTII,S$GLB,, | Performed by: ORTHOPAEDIC SURGERY

## 2023-02-20 PROCEDURE — 99024 PR POST-OP FOLLOW-UP VISIT: ICD-10-PCS | Mod: S$GLB,,, | Performed by: ORTHOPAEDIC SURGERY

## 2023-02-20 NOTE — PROGRESS NOTES
Ms Pizarro returns to clinic today.  She is status post left middle finger irrigation and debridement of the flexor tendon sheath.  She is approximately 2 weeks postop.  She is still having moderate pain.  She feels as if she is doing somewhat better     Physical exam: Examination left middle finger reveals that the incision is healing well.  There are sutures still in place.  There is still mild to moderate edema.  There is no erythema or drainage.  She has capillary refill less than 2 seconds at the tip of the finger.      Wound cultures:  Cultures are all no growth to date but the AFB still pending     Assessment: Left middle finger flexor tenosynovitis     Plan:    1.  Sutures were removed today and Steri-Strips are placed    2.  Will set her up with occupational therapy to begin gentle range of motion    3.  She will follow up with me in 2 weeks for repeat evaluation of her wound.  We will continue to follow the results of the cultures to see if they become positive at some point over the next 4-6 weeks

## 2023-03-02 ENCOUNTER — CLINICAL SUPPORT (OUTPATIENT)
Dept: REHABILITATION | Facility: HOSPITAL | Age: 63
End: 2023-03-02
Payer: COMMERCIAL

## 2023-03-02 DIAGNOSIS — M25.642 STIFFNESS OF FINGER JOINT OF LEFT HAND: ICD-10-CM

## 2023-03-02 DIAGNOSIS — R29.898 HAND WEAKNESS: ICD-10-CM

## 2023-03-02 DIAGNOSIS — M65.9 FLEXOR TENOSYNOVITIS OF FINGER: ICD-10-CM

## 2023-03-02 DIAGNOSIS — M79.645 PAIN OF FINGER OF LEFT HAND: ICD-10-CM

## 2023-03-02 PROCEDURE — 97035 APP MDLTY 1+ULTRASOUND EA 15: CPT | Mod: PN

## 2023-03-02 PROCEDURE — 97166 OT EVAL MOD COMPLEX 45 MIN: CPT | Mod: PN

## 2023-03-02 PROCEDURE — 97140 MANUAL THERAPY 1/> REGIONS: CPT | Mod: PN

## 2023-03-02 PROCEDURE — 97110 THERAPEUTIC EXERCISES: CPT | Mod: PN

## 2023-03-02 NOTE — PATIENT INSTRUCTIONS
"[facility] - OCCUPATIONAL THERAPY  HOME EXERCISE PROGRAM     Complete the following massages for 3-5 minutes each, 2-3x/day.                       Complete the following exercises with 10-15 repetitions each, 2-3x/day.     AROM: DIP Flexion / Extension  Pinch middle knuckle to prevent bending. Bend end knuckle until stretch is felt.   Hold 3 seconds. Relax. Straighten finger as far as possible.    AROM: PIP Flexion / Extension  Pinch bottom knuckle  to prevent bending. Actively bend middle knuckle until stretch is felt.   Hold 3 seconds. Relax. Straighten finger as far as possible.    AROM: Isolated PIP Flexion  Bend only middle joint of your finger, keeping other fingers straight with other hand.    AROM: Isolated MCP Flexion / Extension ("Wave")   Bend only your large, bottom knuckles. Hold 3 seconds. Keep the tips of your fingers straight. Straighten fingers.    AROM: Isolated IPJ Flexion / Extension ("Hook")  Bend only your middle and end knuckles. Hold 3 seconds.   Straighten your fingers.     AROM: MCP and PIP Flexion / Extension ("Straight Fist")  Bend your bottom and middle knuckles, keeping the tips of your fingers straight. Try to touch the pads of your fingers on your palm. Hold 3 seconds. Straighten your fingers.     AROM: Composite Flexion / Extension ("Full Fist")  Bend every joint in your hand into a fist. Hold 3 seconds. Straighten your fingers.     AROM: Composte Extension ("Finger Lifts")  Lift your finger off of the table one at a time. Hold 3 seconds. Relax your finger.    AROM: Abduction / Adduction  With hand flat on table, spread all fingers apart, then bring them together as close as possible.    Copyright © I. All rights reserved.     Therapist: Sukhjinder Reynoso OT     "

## 2023-03-02 NOTE — PLAN OF CARE
[facility]  Occupational Therapy Initial Evaluation    Date: 3/2/2023  Name: Claudy Pizarro  Clinic Number: [id number]    Therapy Diagnosis:   Encounter Diagnoses   Name Primary?    Flexor tenosynovitis of finger     Pain of finger of left hand     Stiffness of finger joint of left hand     Hand weakness      Physician: Derrell Carrero MD    Physician Orders: Please begin therapy to the left hand middle finger.  Include edema control and gentle range of motion.     Frequency:  2-3 times per week   Duration:  4-6 weeks   Medical Diagnosis: M65.9 (ICD-10-CM) - Flexor tenosynovitis of finger;  Left middle finger flexor tenosynovitis status post debridement  Surgical Procedure and Date:    1.  Irrigation and debridement of left middle finger flexor tendon sheath  2.  Left middle finger proximal interphalangeal joint arthrotomy;  02/07/23  Evaluation Date: 3/2/2023  Insurance Authorization Period Expiration: 12/31/2023  Plan of Care Certification Period: 03/02/23 to 04/12/23  Progress Note Due: 04/02/23   Date of Return to MD: 03/08/23  Visit # / Visits authorized: 1 / pending  FOTO: 1/3  Initial=67% / Goal=38%    Precautions:  Standard and blood thinners    Time In:  2:00 pm  Time Out: 3:05 pm  Total Appointment Time (timed & untimed codes): 65 minutes    SUBJECTIVE     Date of Onset:  01/20/2023.    History of Current Condition/Mechanism of Injury: CLAUDY reports she had an insidious onset of inflammation and pain in her L LF that did not resolve with home treatment.  She reports she went to the ED, was hospitalized and received IV fluids and antibiotics.  She was released with p.o. antibiotics and recommendations to follow up with her PCP.  She followed up with her PCP who referred her to Dr. Carrero.  After close monitoring over a little more than a week, it was determined surgical intervention was required to improve condition. Patient has been referred tp OT for post op follow  "up care, per MD orders.     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     Current Functional Status   Home/Living environment: lives alone      Limitation of Functional Status as follows:   ADLs/IADLs:     - Feeding: moderate difficulty/pain cutting foods    - Bathing: moderate difficulty; mostly using R hand only     - Dressing/Grooming: moderate to significant difficulty with fasteners    - Driving: currently does not drive    Pain:  Functional Pain Scale Rating 0-10: Current 4/10, worst 7/10, best 4/10   Location: L LF and to L wrist  Description: Aching, Throbbing, and Sharp  Aggravating Factors: Bending fingers, Lifting/Carrying, and use of L hand  Easing Factors: rest and elevation    Patient's Goals for Therapy: "to get the hand and finger better"    Medical History:   Past Medical History:   Diagnosis Date    Endometriosis     Hypertension     Obesity, Class II, BMI 35-39.9, with comorbidity     Varicose veins        Surgical History:    has a past surgical history that includes Hysterectomy; Breast lumpectomy; Bilateral salpingoophorectomy; and irrigation and debridement (Left, 2/7/2023).    Medications:   has a current medication list which includes the following prescription(s): amlodipine-benazepril 5-20 mg, diclofenac, hydrochlorothiazide, methocarbamol, methylprednisolone, ondansetron, oxycodone, and tramadol.    Allergies:   Review of patient's allergies indicates:   Allergen Reactions    Naproxen Hives          OBJECTIVE     Observation/Appearance:  Surgical incision is closed and healing well without signs of infection noted in L LF.  Skin dry and " "flaking about the surgical site, Skin shiny/tight, moderate to significant Joint stiffness is noted in the L hand/long finger, Moderate generalized Edema present in L L;  Patient is noted to have multiple, mild degenerative changes in the L hand/fingers    Edema. Measured in centimeters.   3/2/2023 3/2/2023    Left Right   Long:       P1 9.7 8.5    PIP 9.4 8.0   P2 8.3 7.0    DIP 7.2 6.5     Hand ROM. Measured in degrees.   3/2/2023    Left       Index: MP  0/55              PIP     0/50              DIP 0/30              LLANES 135       Long:  MP 0/57              PIP -7/60              DIP 0/30              LLANES 140       Ring:   MP 0/60              PIP 0/61              DIP 0/25              LLANES 146       Small:  MP 0/53               PIP -10/70               DIP 0/35              LLANES 148       Thumb: MP WFL                IP WFL       Rad ADD/ABD WFL       Pal ADD/ABD WFL          Opposition To SF P2     Sensation:  Patient denies numbness & tingling at this time. Light touch, temp & sharp/dull, grossly intact for L forearm/wrist/hand      Strength (Dyanmometer) and Pinch Strength (Pinch Gauge)  Measured in pounds and psi. Average of three trials.   3/2/2023 3/2/2023    Left Right   Rung II deferred deferred   Key Pinch " "   3pt Pinch " "   2pt Pinch " "         Limitation/Restriction for FOTO Hand Survey    Therapist reviewed FOTO scores for Claudy Pizarro on 3/2/2023.   FOTO documents entered into [employer] - see Media section.    Limitation Score: 67%         Treatment     Total Treatment time (time-based codes) separate from Evaluation: 35 minutes    CLAUDY received the treatments listed below:     Direct contact modalities after being cleared for contraindications: 3.0 MHz, .6 W/cm2, 20% pulsed, 8 min to L LF MP-IP, volar aspect to decrease pain & edema, increase circulation and tissue extensibility    Manual therapy techniques: Manual Lymphatic Drainage and Soft tissue Mobilization were applied to the: L " hand/fingers for 12 minutes, including:  use of hand techniques to decrease edema and improve lymphatic drainage, increase blood flow/circulation, improve soft tissue pliability and decrease pain.     Therapeutic exercises to develop ROM and flexibility for 15 minutes, including:  - Performed Initial HEP, see Patient Instructions for details on exercises       Patient Education and Home Exercises      Education provided:   - Discussed precautions to monitor in wound care and infection prevention  - Established initial HEP    Written Home Exercises Provided: yes.  Exercises were reviewed and CLAUDY was able to demonstrate them prior to the end of the session.  CLAUDY demonstrated good  understanding of the education provided. See EMR under Patient Instructions for exercises provided during therapy sessions.     Pt was advised to perform these exercises free of pain, and to stop performing them if pain occurs.    Patient/Family Education: role of OT, goals for OT, scheduling/cancellations - pt verbalized understanding. Discussed insurance limitations with patient.      ASSESSMENT     Claudy Pizarro is a 62 y.o. female referred to outpatient occupational therapy and presents with a medical diagnosis of M65.9 (ICD-10-CM) - Flexor tenosynovitis of finger.  Patient presents with the following therapy deficits: Decreased ROM, Decreased  strength, Decreased pinch strength, Decreased muscle strength, Decreased functional hand use, Increased pain, Edema, Joint Stiffness, Scar Adhesions, and Diminished/Impaired Coordination and demonstrates limitations as described in the chart below. Following medical record review it is determined that pt will benefit from occupational therapy services in order to maximize pain free and/or functional use of left hand/long finger. The following goals were discussed with the patient and patient is in agreement with them as to be addressed in the treatment plan. The patient's rehab  potential is Good to Fair    Anticipated barriers to occupational therapy: patient expressed she does not have consistent transportation; comorbid diagnosis listed above, compliance with HEP and attendance to therapy as recommended    Pt has no cultural, educational or language barriers to learning provided.    Profile and History Assessment of Occupational Performance Level of Clinical Decision Making Complexity Score   Occupational Profile:   Makenzie Pizarro is a 62 y.o. female who lives alone and is currently employed Makenzie Pizarro has difficulty with  ADLs and IADLs as listed previously, which  Affecting herdaily functional abilities.      Comorbidities:    has a past medical history of Endometriosis, Hypertension, Obesity, Class II, BMI 35-39.9, with comorbidity, and Varicose veins.    Medical and Therapy History Review:   Expanded   Performance Deficits    Physical:  Joint Mobility  Muscle Power/Strength  Skin Integrity/Scar Formation  Edema   Strength  Pinch Strength  Fine Motor Coordination  Pain    Cognitive:  No Deficits    Psychosocial:    Transportation concerns     Clinical Decision Making:  moderate    Assessment Process:  Detailed Assessments    Modification/Need for Assistance:  Minimal-Moderate Modifications/Assistance    Intervention Selection:  Several Treatment Options       moderate  Based on PMHX, co morbidities , data from assessments and functional level of assistance required with task and clinical presentation directly impacting function.       The following goals were discussed with the patient and patient is in agreement with them as to be addressed in the treatment plan.     Goals:   Short Term Goals: (in 3 weeks)  1) Patient will be independent in HEP  2) Decrease pain in L Hand/long finger to no more than 4-5/10 worst in ADL/IADL's  3) Increase AROM in L IF-SF TAMs by 5-8 degrees for improved functioning in ADL/IADL's  4) Assess /pinch strength  5) Decrease edema in L LF  by .3 cm     Long Term Goals: (in 6 weeks)  1) Decrease pain in L hand/long finger to no more than 1-2/10 worst in ADL/IADL's  2) Increase AROM of L IF-SF TAMs to WFL for increased functioning in ADL/IADL's  3) Increase strength in L  by 15% of initial measures for improved functioning in ADL/IADL's  4) Increased functioning in ADL/IADL's, as evidenced by a FOTO impairment rating of no more than 38%  5) Decrease edema in L long finger to trace or none    PLAN     Plan of Care Certification: 03/02/23 to 04/12/23.     Outpatient Occupational Therapy 1-2 times weekly for 6 weeks to include the following interventions: Paraffin, Fluidotherapy, Manual therapy/joint mobilizations, Modalities for pain management, US 3 mhz, Therapeutic exercises/activities., Strengthening, Edema Control, Scar Management, and Joint Protection.      Sukhjinder Reynoso OT      I CERTIFY THE NEED FOR THESE SERVICES FURNISHED UNDER THIS PLAN OF TREATMENT AND WHILE UNDER MY CARE  Physician's comments:      Physician's Signature: ___________________________________________________

## 2023-03-08 ENCOUNTER — OFFICE VISIT (OUTPATIENT)
Dept: ORTHOPEDICS | Facility: CLINIC | Age: 63
End: 2023-03-08
Payer: COMMERCIAL

## 2023-03-08 VITALS — BODY MASS INDEX: 36.43 KG/M2 | HEIGHT: 69 IN | WEIGHT: 246 LBS

## 2023-03-08 DIAGNOSIS — M65.9 FLEXOR TENOSYNOVITIS OF FINGER: Primary | ICD-10-CM

## 2023-03-08 LAB
FUNGUS SPEC CULT: NORMAL

## 2023-03-08 PROCEDURE — 99024 PR POST-OP FOLLOW-UP VISIT: ICD-10-PCS | Mod: S$GLB,,, | Performed by: ORTHOPAEDIC SURGERY

## 2023-03-08 PROCEDURE — 99024 POSTOP FOLLOW-UP VISIT: CPT | Mod: S$GLB,,, | Performed by: ORTHOPAEDIC SURGERY

## 2023-03-08 PROCEDURE — 3008F PR BODY MASS INDEX (BMI) DOCUMENTED: ICD-10-PCS | Mod: CPTII,S$GLB,, | Performed by: ORTHOPAEDIC SURGERY

## 2023-03-08 PROCEDURE — 4010F PR ACE/ARB THEARPY RXD/TAKEN: ICD-10-PCS | Mod: CPTII,S$GLB,, | Performed by: ORTHOPAEDIC SURGERY

## 2023-03-08 PROCEDURE — 4010F ACE/ARB THERAPY RXD/TAKEN: CPT | Mod: CPTII,S$GLB,, | Performed by: ORTHOPAEDIC SURGERY

## 2023-03-08 PROCEDURE — 3008F BODY MASS INDEX DOCD: CPT | Mod: CPTII,S$GLB,, | Performed by: ORTHOPAEDIC SURGERY

## 2023-03-08 PROCEDURE — 99999 PR PBB SHADOW E&M-EST. PATIENT-LVL II: ICD-10-PCS | Mod: PBBFAC,,, | Performed by: ORTHOPAEDIC SURGERY

## 2023-03-08 PROCEDURE — 99999 PR PBB SHADOW E&M-EST. PATIENT-LVL II: CPT | Mod: PBBFAC,,, | Performed by: ORTHOPAEDIC SURGERY

## 2023-03-08 NOTE — PROGRESS NOTES
Ms Pizarro returns to clinic today.  She has a history of left middle finger tenosynovitis.  She has only been able to go to a limited number therapy treatments because of transportation issues.  She is still having some swelling and soreness to the finger.      Physical exam:  Examination of the left middle finger reveals that the wounds over the volar finger well healed nail.  There is still moderate edema.  There is no erythema.  She still has mild tenderness throughout the finger.  She does have limitation of flexion.  She has capillary refill less than 2 seconds at the tip.      Wound cultures: All the wound cultures are no growth but the fungal cultures as well as the AFB cultures are still preliminary.      Assessment:  Left middle finger tenosynovitis     Plan:    1. She will continue to work with therapy and I have encouraged her to continue aggressive home program for swelling control and range of motion    2.  Will continue to follow her cultures for any need for further antibiotic treatment    3.  She will follow up with me in 3 weeks for repeat evaluation

## 2023-03-16 ENCOUNTER — CLINICAL SUPPORT (OUTPATIENT)
Dept: REHABILITATION | Facility: HOSPITAL | Age: 63
End: 2023-03-16
Payer: COMMERCIAL

## 2023-03-16 DIAGNOSIS — M25.642 STIFFNESS OF FINGER JOINT OF LEFT HAND: ICD-10-CM

## 2023-03-16 DIAGNOSIS — M79.645 PAIN OF FINGER OF LEFT HAND: Primary | ICD-10-CM

## 2023-03-16 DIAGNOSIS — R29.898 HAND WEAKNESS: ICD-10-CM

## 2023-03-16 PROCEDURE — 97140 MANUAL THERAPY 1/> REGIONS: CPT | Mod: PN

## 2023-03-16 PROCEDURE — 97110 THERAPEUTIC EXERCISES: CPT | Mod: PN

## 2023-03-16 PROCEDURE — 97112 NEUROMUSCULAR REEDUCATION: CPT | Mod: PN

## 2023-03-16 PROCEDURE — 97035 APP MDLTY 1+ULTRASOUND EA 15: CPT | Mod: PN

## 2023-03-16 NOTE — PROGRESS NOTES
[facility]  Occupational Therapy Treatment Note    Date: 3/16/2023  Name: Claudy Pizarro  Clinic Number: [id number]    Therapy Diagnosis:   Encounter Diagnoses   Name Primary?    Pain of finger of left hand Yes    Stiffness of finger joint of left hand     Hand weakness      Physician: Derrell Carrero MD    Physician Orders: Please begin therapy to the left hand middle finger.  Include edema control and gentle range of motion.     Frequency:  2-3 times per week   Duration:  4-6 weeks   Medical Diagnosis: M65.9 (ICD-10-CM) - Flexor tenosynovitis of finger;  Left middle finger flexor tenosynovitis status post debridement  Surgical Procedure and Date:    1.  Irrigation and debridement of left middle finger flexor tendon sheath  2.  Left middle finger proximal interphalangeal joint arthrotomy;  02/07/23  Evaluation Date: 3/2/2023  Insurance Authorization Period Expiration: 12/31/2023  Plan of Care Certification Period: 03/02/23 to 04/12/23  Progress Note Due: 04/02/23   Date of Return to MD: 03/08/23  FOTO: 1/3  Initial=67% / Goal=38%    Visit # / Visits authorized: 1 / 20    Precautions:  Precautions:  Standard and blood thinners     Time In: 2:00 pm  Time Out: 3:00 pm  Total Billable Time: 60 minutes  (1US, 1MT, 1NM, 1TE)    SUBJECTIVE     Pt reports: No problems reported with initial HEP  She was compliant with home exercise program given last session.   Response to previous treatment: No adverse reactions noted or reported  Functional change: None reported at first treatment after initial evaluation     Pain: 4/10  Location: left hand/long finger    OBJECTIVE     Objective Measures updated at progress report unless specified.    Treatment     CLAUDY received the treatments listed below:     Direct contact modalities after being cleared for contraindications:  3.0 MHz, 1.0 W/cm2, continuous, 10 min to L LF surgical site, to decrease pain & edema, increase circulation and tissue  extensibility     Manual therapy techniques: Myofacial release, Manual Lymphatic Drainage, Soft tissue Mobilization, and Scar Management were applied to the: L forearm/hand/fingers for 20 minutes, including:    -use of hand techniques, cupping and IASTM tools to increase blood flow/circulation, improve soft tissue pliability and decrease pain.  -Kinesiotaping: Single spiral wrap applied to L LF for edema, pain and soft tissue management. Patient instructed on purpose, wear, care, precautions to monitor and removal of KT. Patient verbalized understanding of all instructions provided.      Therapeutic exercises to develop strength, endurance, and ROM for 20 minutes, including:  AROM     -TGE's (wave, hook, fist) 5 reps each   -Finger Extension lifts-IF-RF 5 reps each   -Isolated LF PIP & DIP Flexion 10 reps   PROM     -MP Block in flexion IF-SF 10 reps   -Composite fist 10 reps     Neuromuscular re-education activities to improve Coordination for 10 minutes. The following activities were included:  Isospheres  1 min   9 Peg  2 trials   [place] Manipulation 2 trials, 10 coins       Patient Education and Home Exercises      Education provided:   - Reviewed HEP  - Progress towards goals     Written Home Exercises Provided: Patient instructed to cont prior HEP.  Exercises were reviewed and CLAUDY was able to demonstrate them prior to the end of the session.  CLAUDY demonstrated good  understanding of the HEP provided. See EMR under Patient Instructions for exercises provided during therapy sessions.       Assessment     Patient continues to present with significant edema in her L LF, but this does improve after manual lymphatic drainage.  Scar tissue is moderate to significantly dense at the surgical site, but also improves after therapy.  Patient was able to perform all above listed therapeutic exercises without increased pain or difficulty today.    CLAUDY is progressing well towards her goals and there are no updates  to goals at this time. Pt prognosis is Good.     Pt will continue to benefit from skilled outpatient occupational therapy to address the deficits listed in the problem list on initial evaluation provide pt/family education and to maximize pt's level of independence in the home and community environment.     Pt's spiritual, cultural and educational needs considered and pt agreeable to plan of care and goals.    Anticipated barriers to occupational therapy: comorbid diagnosis listed above, compliance with HEP and attendance to therapy as recommended    Goals:  Short Term Goals: (in 3 weeks)  1) Patient will be independent in HEP  2) Decrease pain in L Hand/long finger to no more than 4-5/10 worst in ADL/IADL's  3) Increase AROM in L IF-SF TAMs by 5-8 degrees for improved functioning in ADL/IADL's  4) Assess /pinch strength  5) Decrease edema in L LF by .3 cm      Long Term Goals: (in 6 weeks)  1) Decrease pain in L hand/long finger to no more than 1-2/10 worst in ADL/IADL's  2) Increase AROM of L IF-SF TAMs to WFL for increased functioning in ADL/IADL's  3) Increase strength in L  by 15% of initial measures for improved functioning in ADL/IADL's  4) Increased functioning in ADL/IADL's, as evidenced by a FOTO impairment rating of no more than 38%  5) Decrease edema in L long finger to trace or none    PLAN     Plan of Care Certification: 03/02/23 to 04/12/23.      Outpatient Occupational Therapy 1-2 times weekly for 6 weeks to include the following interventions: Paraffin, Fluidotherapy, Manual therapy/joint mobilizations, Modalities for pain management, US 3 mhz, Therapeutic exercises/activities., Strengthening, Edema Control, Scar Management, and Joint Protection.    Updates/Grading for next session: progress to light strengthening, as tolerated      Sukhjinder Reynoso, OT

## 2023-03-23 ENCOUNTER — CLINICAL SUPPORT (OUTPATIENT)
Dept: REHABILITATION | Facility: HOSPITAL | Age: 63
End: 2023-03-23
Payer: COMMERCIAL

## 2023-03-23 DIAGNOSIS — M79.645 PAIN OF FINGER OF LEFT HAND: Primary | ICD-10-CM

## 2023-03-23 DIAGNOSIS — R29.898 HAND WEAKNESS: ICD-10-CM

## 2023-03-23 DIAGNOSIS — M25.642 STIFFNESS OF FINGER JOINT OF LEFT HAND: ICD-10-CM

## 2023-03-23 PROCEDURE — 97110 THERAPEUTIC EXERCISES: CPT | Mod: PN

## 2023-03-23 PROCEDURE — 97035 APP MDLTY 1+ULTRASOUND EA 15: CPT | Mod: PN

## 2023-03-23 PROCEDURE — 97112 NEUROMUSCULAR REEDUCATION: CPT | Mod: PN

## 2023-03-23 PROCEDURE — 97140 MANUAL THERAPY 1/> REGIONS: CPT | Mod: PN

## 2023-03-23 NOTE — PROGRESS NOTES
[facility]  Occupational Therapy Treatment Note    Date: 3/23/2023  Name: Claudy Pizarro  Clinic Number: [id number]    Therapy Diagnosis:   Encounter Diagnoses   Name Primary?    Pain of finger of left hand Yes    Stiffness of finger joint of left hand     Hand weakness      Physician: Derrell Carrero MD    Physician Orders: Please begin therapy to the left hand middle finger.  Include edema control and gentle range of motion.     Frequency:  2-3 times per week   Duration:  4-6 weeks   Medical Diagnosis: M65.9 (ICD-10-CM) - Flexor tenosynovitis of finger;  Left middle finger flexor tenosynovitis status post debridement  Surgical Procedure and Date:    1.  Irrigation and debridement of left middle finger flexor tendon sheath  2.  Left middle finger proximal interphalangeal joint arthrotomy;  02/07/23  Evaluation Date: 3/2/2023  Insurance Authorization Period Expiration: 12/31/2023  Plan of Care Certification Period: 03/02/23 to 04/12/23  Progress Note Due: 04/02/23   Date of Return to MD: 03/08/23  FOTO: 1/3  Initial=67% / Goal=38%    Visit # / Visits authorized: 2 / 20    Precautions:  Precautions:  Standard and blood thinners     Time In: 2:00 pm  Time Out: 3:10 pm  Total Billable Time: 70 minutes  (1US, 2MT, 1NM, 1TE)    SUBJECTIVE     Pt reports: She continues to have edema and pain in her L LF.  She states she is unable to come to therapy more frequently due to lack of transportation.  She was compliant with home exercise program given last session.   Response to previous treatment: slight decrease in L hand/LF edema, increase in L LF AROM    Functional change: able to hold light objects in L hand without difficulty or pain     Pain:  5/10  Location: left hand/long finger    OBJECTIVE     Objective Measures updated at progress report unless specified.    Treatment     CLAUDY received the treatments listed below:     Direct contact modalities after being cleared for  contraindications:  3.0 MHz, 1.0 W/cm2, continuous, 10 min to L LF surgical site, to decrease pain & edema, increase circulation and tissue extensibility     Manual therapy techniques: Myofacial release, Manual Lymphatic Drainage, Soft tissue Mobilization, and Scar Management were applied to the: L forearm/hand/fingers for 30 minutes, including:    -use of hand techniques, cupping/scar pump and IASTM tools to increase blood flow/circulation, improve soft tissue pliability and decrease pain.  -Edema wrap using Coban applied to L LF.  Patient instructed on home application process, purpose, wear and precautions to monitor.  Patient verbalized understanding of all instructions provided.      Therapeutic exercises to develop strength, endurance, and ROM for 22 minutes, including:  AROM     -TGE's (wave, hook, fist) 10 reps each   -Finger Extension lifts-IF-RF 5 reps each   -Isolated LF PIP & DIP Flexion 10 reps   PROM     -MP Block in flexion IF-SF 10 reps   -Composite fist 10 reps       Strengthening:    Hand gripper 3/10 reps, 2 yellow bands   Resistive clothespin w/ lateral & tripod pinch 2/10 reps, yellow clip     Neuromuscular re-education activities to improve Coordination for 8 minutes. The following activities were included:  Isospheres  1 min   [place] Manipulation 2 trials, 20 coins       Patient Education and Home Exercises      Education provided:   - Patient instructed on the benefits of warm water and epsom salt soaks for home program to address pain and edema in L hand/fingers  - Reviewed HEP  - Progress towards goals     Written Home Exercises Provided: Patient instructed to cont prior HEP.  Exercises were reviewed and CLAUDY was able to demonstrate them prior to the end of the session.  CLAUDY demonstrated good  understanding of the HEP provided. See EMR under Patient Instructions for exercises provided during therapy sessions.       Assessment     Edema has decreased to moderate in patient's L hand/LF.   Scar tissue remains moderately dense at the surgical site, but continues to improve by the end of the treatment session.  A/PROM continues to present with deficits, but slow steady progress is noted.  Patient was able to perform all above listed therapeutic exercises without increased pain or difficulty today.  Patient would benefit from receiving OT services 2-3 times per week, but is currently unable due to lack of transportation.     CLAUDY is progressing well towards her goals and there are no updates to goals at this time. Pt prognosis is Good.     Pt will continue to benefit from skilled outpatient occupational therapy to address the deficits listed in the problem list on initial evaluation provide pt/family education and to maximize pt's level of independence in the home and community environment.     Pt's spiritual, cultural and educational needs considered and pt agreeable to plan of care and goals.    Anticipated barriers to occupational therapy: comorbid diagnosis listed above, compliance with HEP and attendance to therapy as recommended    Goals:  Short Term Goals: (in 3 weeks)  1) Patient will be independent in HEP  2) Decrease pain in L Hand/long finger to no more than 4-5/10 worst in ADL/IADL's  3) Increase AROM in L IF-SF TAMs by 5-8 degrees for improved functioning in ADL/IADL's  4) Assess /pinch strength  5) Decrease edema in L LF by .3 cm      Long Term Goals: (in 6 weeks)  1) Decrease pain in L hand/long finger to no more than 1-2/10 worst in ADL/IADL's  2) Increase AROM of L IF-SF TAMs to WFL for increased functioning in ADL/IADL's  3) Increase strength in L  by 15% of initial measures for improved functioning in ADL/IADL's  4) Increased functioning in ADL/IADL's, as evidenced by a FOTO impairment rating of no more than 38%  5) Decrease edema in L long finger to trace or none    PLAN     Plan of Care Certification: 03/02/23 to 04/12/23.      Outpatient Occupational Therapy 1-2 times  weekly for 6 weeks to include the following interventions: Paraffin, Fluidotherapy, Manual therapy/joint mobilizations, Modalities for pain management, US 3 mhz, Therapeutic exercises/activities., Strengthening, Edema Control, Scar Management, and Joint Protection.    Updates/Grading for next session: progress to light strengthening, as tolerated      Sukhjinder Reynoso, OT

## 2023-03-28 LAB
ACID FAST MOD KINY STN SPEC: NORMAL
MYCOBACTERIUM SPEC QL CULT: NORMAL

## 2023-04-05 ENCOUNTER — OFFICE VISIT (OUTPATIENT)
Dept: ORTHOPEDICS | Facility: CLINIC | Age: 63
End: 2023-04-05
Payer: COMMERCIAL

## 2023-04-05 VITALS — HEIGHT: 69 IN | WEIGHT: 246 LBS | BODY MASS INDEX: 36.43 KG/M2

## 2023-04-05 DIAGNOSIS — M65.9 FLEXOR TENOSYNOVITIS OF FINGER: Primary | ICD-10-CM

## 2023-04-05 PROCEDURE — 99024 POSTOP FOLLOW-UP VISIT: CPT | Mod: S$GLB,,, | Performed by: ORTHOPAEDIC SURGERY

## 2023-04-05 PROCEDURE — 1159F PR MEDICATION LIST DOCUMENTED IN MEDICAL RECORD: ICD-10-PCS | Mod: CPTII,S$GLB,, | Performed by: ORTHOPAEDIC SURGERY

## 2023-04-05 PROCEDURE — 1159F MED LIST DOCD IN RCRD: CPT | Mod: CPTII,S$GLB,, | Performed by: ORTHOPAEDIC SURGERY

## 2023-04-05 PROCEDURE — 4010F PR ACE/ARB THEARPY RXD/TAKEN: ICD-10-PCS | Mod: CPTII,S$GLB,, | Performed by: ORTHOPAEDIC SURGERY

## 2023-04-05 PROCEDURE — 99999 PR PBB SHADOW E&M-EST. PATIENT-LVL III: ICD-10-PCS | Mod: PBBFAC,,, | Performed by: ORTHOPAEDIC SURGERY

## 2023-04-05 PROCEDURE — 3008F BODY MASS INDEX DOCD: CPT | Mod: CPTII,S$GLB,, | Performed by: ORTHOPAEDIC SURGERY

## 2023-04-05 PROCEDURE — 99999 PR PBB SHADOW E&M-EST. PATIENT-LVL III: CPT | Mod: PBBFAC,,, | Performed by: ORTHOPAEDIC SURGERY

## 2023-04-05 PROCEDURE — 4010F ACE/ARB THERAPY RXD/TAKEN: CPT | Mod: CPTII,S$GLB,, | Performed by: ORTHOPAEDIC SURGERY

## 2023-04-05 PROCEDURE — 99024 PR POST-OP FOLLOW-UP VISIT: ICD-10-PCS | Mod: S$GLB,,, | Performed by: ORTHOPAEDIC SURGERY

## 2023-04-05 PROCEDURE — 3008F PR BODY MASS INDEX (BMI) DOCUMENTED: ICD-10-PCS | Mod: CPTII,S$GLB,, | Performed by: ORTHOPAEDIC SURGERY

## 2023-04-05 NOTE — PROGRESS NOTES
Ms Pizarro returns to clinic today.  Has a history of left middle finger flexor tenosynovitis.  She is doing well.  States that her range of motion is improving her swelling is also improving.  She is only been able to attend a couple of therapy visits due to transportation issues     Physical exam: Examination left middle finger reveals that the incision is well healed.  There is only mild edema remaining.  Palpation produces only minimal tenderness over the area of the scar.  She is able to flex to within 1 cm of the distal palmar crease and fully extend within 10° of full extension.  She does have sensation intact at the tip and capillary refill is less than 2 seconds     Wound cultures:  All cultures are no growth to date     Assessment:  Left middle finger flexor tenosynovitis     Plan:    1. She will continue to work on a home program of range of motion and edema control     2. I did talk to her about scar massage and desensitization    3.  She will follow up in 4 weeks for final evaluation    4.  Will provide her with a note to return to work in 2 weeks

## (undated) DEVICE — Device

## (undated) DEVICE — GAUZE SPONGE 4X4 12PLY

## (undated) DEVICE — SEE L#120831

## (undated) DEVICE — FORCEP STRAIGHT DISP

## (undated) DEVICE — SYR 10CC LUER LOCK

## (undated) DEVICE — PENCIL ROCKER SWITCH 10FT CORD

## (undated) DEVICE — BANDAGE ESMARK LATEX FREE 4INX

## (undated) DEVICE — DRAPE HALF SURGICAL 40X58IN

## (undated) DEVICE — PADDING CAST 4IN SPECIALIST

## (undated) DEVICE — GLOVE PROTEXIS LTX MICRO 8

## (undated) DEVICE — ALCOHOL 70% ISOP RUBBING 4OZ

## (undated) DEVICE — TOURNIQUET SB QC DP 18X4IN

## (undated) DEVICE — PAD CAST SPECIALIST STRL 3

## (undated) DEVICE — NDL HYPO 27G X 1 1/2

## (undated) DEVICE — DRAPE STERI-DRAPE 1000 17X11IN

## (undated) DEVICE — GLOVE PROTEXIS LTX MICRO  7.5

## (undated) DEVICE — TUBING SUC UNIV W/CONN 12FT

## (undated) DEVICE — SUT ETHILON 4-0 PS2 18 BLK

## (undated) DEVICE — ELECTRODE REM PLYHSV RETURN 9

## (undated) DEVICE — DRAPE U SPLIT SHEET 54X76IN

## (undated) DEVICE — DRESSING XEROFORM FOIL PK 1X8

## (undated) DEVICE — DRAPE THREE-QTR REINF 53X77IN

## (undated) DEVICE — DRAPE HAND STERILE

## (undated) DEVICE — CORD BIPOLAR 12 FOOT

## (undated) DEVICE — APPLICATOR CHLORAPREP ORN 26ML

## (undated) DEVICE — BANDAGE ELAS SOFTWRAP ST 3X5YD

## (undated) DEVICE — BOWL STERILE LARGE 32OZ